# Patient Record
Sex: FEMALE | Race: WHITE | Employment: UNEMPLOYED | URBAN - METROPOLITAN AREA
[De-identification: names, ages, dates, MRNs, and addresses within clinical notes are randomized per-mention and may not be internally consistent; named-entity substitution may affect disease eponyms.]

---

## 2018-05-25 ENCOUNTER — TELEPHONE (OUTPATIENT)
Dept: INTERNAL MEDICINE CLINIC | Age: 63
End: 2018-05-25

## 2018-05-25 ENCOUNTER — OFFICE VISIT (OUTPATIENT)
Dept: INTERNAL MEDICINE CLINIC | Age: 63
End: 2018-05-25

## 2018-05-25 VITALS
TEMPERATURE: 97.8 F | OXYGEN SATURATION: 99 % | HEIGHT: 62 IN | WEIGHT: 114 LBS | BODY MASS INDEX: 20.98 KG/M2 | SYSTOLIC BLOOD PRESSURE: 122 MMHG | RESPIRATION RATE: 18 BRPM | DIASTOLIC BLOOD PRESSURE: 74 MMHG | HEART RATE: 61 BPM

## 2018-05-25 DIAGNOSIS — Z12.39 SCREENING FOR MALIGNANT NEOPLASM OF BREAST: Primary | ICD-10-CM

## 2018-05-25 DIAGNOSIS — F42.9 OBSESSIVE-COMPULSIVE DISORDER, UNSPECIFIED TYPE: ICD-10-CM

## 2018-05-25 DIAGNOSIS — F33.1 MODERATE EPISODE OF RECURRENT MAJOR DEPRESSIVE DISORDER (HCC): ICD-10-CM

## 2018-05-25 DIAGNOSIS — Z11.59 NEED FOR HEPATITIS C SCREENING TEST: ICD-10-CM

## 2018-05-25 DIAGNOSIS — F42.2 MIXED OBSESSIONAL THOUGHTS AND ACTS: Primary | ICD-10-CM

## 2018-05-25 DIAGNOSIS — Z13.220 SCREENING FOR CHOLESTEROL LEVEL: ICD-10-CM

## 2018-05-25 NOTE — TELEPHONE ENCOUNTER
----- Message from Thomas Colunga sent at 5/25/2018  2:40 PM EDT -----  Regarding: Dr. Ivy Nagel is requesting a call back in regards to receiving an authorization form from [place] [id number] for Bortioxetine (Trintellix). Pt needs forms faxed to [employer] once received.   Best contact number: [phone]      Message copied/pasted from [facility]

## 2018-05-25 NOTE — PROGRESS NOTES
Ms. Amee Benavides is a new patient who is here to establish care. CC:  Establish Care and Depression       HPI:    Patient moved to [place] recently. Here to establish care. Patient struggles with depression and anxiety. Several years of anxiety and recent death of her mother has increased stress. Recently moved into her mother's house.    Recent divorce in the past year   Inherited a house   Told her  to move out - \" patient told she was tired of being a caregiver\"  \" I just want to focus on myself\"   Sleeps well  Denies suicidal thoughts / frequently cries    Patient has OCD - not taking medication for OCD \" I like things clean\"     Did not like prozac /zoloft, wellbutrin/  decreased sex drive     Lexapro caused fatigue         Smoking: smokes 1 pack daily         Review of systems:  Constitutional: negative for fever, chills, weight loss, night sweats   Eyes : negative for vision changes, eye pain and discharge  Nose and Throat: negative for tinnitus, sore throat   Cardiovascular: negative for chest pain, palpitations and shortness of breath  Respiratory: negative for shortness of breath, cough and wheezing   Gastroinstestinal: negative for abdominal pain, nausea, vomiting, diarrhea, constipation, and blood in the stool  Musculoskeletal: negative for back ache and joint ache   Genitourinary: negative for dysuria, nocturia, polyuria and hematuria   Neurologic: Negative for focal weakness, numbness or incoordination  Skin: negative for rash, pruritus  Hematologic: negative for easy bruising      Past Medical History:   Diagnosis Date    DDD (degenerative disc disease), cervical     Glaucoma, narrow-angle     Heart murmur     Lumbar spondylolysis     OCD (obsessive compulsive disorder)         Past Surgical History:   Procedure Laterality Date    HX CERVICAL FUSION  2004    HX HYSTERECTOMY         Allergies   Allergen Reactions    Codeine Nausea and Vomiting    Pcn [Penicillins] Hives Current Outpatient Prescriptions on File Prior to Visit   Medication Sig Dispense Refill    tretinoin (RETIN-A) 0.1 % topical cream Apply 1 g to affected area nightly. 45 g 5    aspirin 81 mg chewable tablet Take 1 Tab by mouth daily. 90 Tab 3     No current facility-administered medications on file prior to visit. family history includes Diabetes in her father; Heart Attack in her father; Heart Disease in her father. 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      Social History Main Topics    Smoking status: Current Every Day Smoker     Packs/day: 1.00     Years: 40.00    Smokeless tobacco: Never Used    Alcohol use No    Drug use: No    Sexual activity: Not Currently     Other Topics Concern    Not on file     Social History Narrative    Mom has dementia       Visit Vitals    /74 (BP 1 Location: Right arm, BP Patient Position: Sitting)    Pulse 61    Temp 97.8 °F (36.6 °C) (Oral)    Resp 18    Ht 5' 2\" (1.575 m)    Wt 114 lb (51.7 kg)    LMP 12/16/2001    SpO2 99%    BMI 20.85 kg/m2     General:  Well appearing female no acute distress  HEENT:   PERRL,normal conjunctiva. External ear and canals normal, TMs normal.  Hearing normal to voice. Nose without edema or discharge, normal septum. Lips, teeth, gums normal.  Oropharynx: no erythema, no exudates, no lesions, normal tongue. Neck:  Supple. Thyroid normal size, nontender, without nodules. No carotid bruit. No masses or lymphadenopathy  Respiratory: no respiratory distress,  no wheezing, no rhonchi, no rales. No chest wall tenderness. Cardiovascular:  RRR, normal S1S2, no murmur. Gastrointestinal: normal bowel sounds, soft, nontender, without masses. No hepatosplenomegaly. Extremities +2 pulses, no edema, normal sensation   Musculoskeletal:  Normal gait.  Normal digits and nails. Normal strength and tone, no atrophy, and no abnormal movement. Skin:  No rash, no lesions, no ulcers. Skin warm, normal turgor, without induration or nodules. Neuro:  A and OX4, fluent speech, cranial nerves normal 2-12. Sensation normal to light touch. DTR symmetrical  Psych:  Normal affect      Lab Results   Component Value Date/Time    WBC 6.9 01/09/2015 12:00 AM    HGB 14.7 01/09/2015 12:00 AM    HCT 42.6 01/09/2015 12:00 AM    PLATELET 645 90/90/1148 12:00 AM    MCV 90 01/09/2015 12:00 AM     Lab Results   Component Value Date/Time    Sodium 140 04/21/2016 12:16 PM    Potassium 4.2 04/21/2016 12:16 PM    Chloride 99 04/21/2016 12:16 PM    CO2 24 04/21/2016 12:16 PM    Glucose 87 04/21/2016 12:16 PM    BUN 15 04/21/2016 12:16 PM    Creatinine 0.68 04/21/2016 12:16 PM    BUN/Creatinine ratio 22 04/21/2016 12:16 PM    GFR est  04/21/2016 12:16 PM    GFR est non-AA 97 04/21/2016 12:16 PM    Calcium 9.5 04/21/2016 12:16 PM     Lab Results   Component Value Date/Time    Cholesterol, total 246 (H) 04/21/2016 12:16 PM    HDL Cholesterol 80 04/21/2016 12:16 PM    LDL, calculated 150 (H) 04/21/2016 12:16 PM    VLDL, calculated 16 04/21/2016 12:16 PM    Triglyceride 79 04/21/2016 12:16 PM     Lab Results   Component Value Date/Time    TSH 7.620 (H) 01/09/2015 12:00 AM     Lab Results   Component Value Date/Time    Hemoglobin A1c 5.9 (H) 11/03/2010 09:26 AM     No results found for: Juan Kevin, XQVID3, XQVID, VD3RIA                Assessment and Plan:     64 yo woman with a hx of OCD and depression, tobacco abuse presenting to establish care    1. Screening for malignant neoplasm of breast  - BETTE MAMMOGRAM SCREENING DIGITAL BILAT; Future    2. Obsessive-compulsive disorder, unspecified type  3.  Moderate episode of recurrent major depressive disorder [facility])  Given resources/ list of psychologists to make appointment  Not suicidal  Declined referral to psychiatrist  - failed multiple SSRIs due to side effect. Trial of trintellix for depression   - vortioxetine (TRINTELLIX) 10 mg tablet; Take 1 Tab by mouth daily. Dispense: 30 Tab; Refill: 1  - METABOLIC PANEL, COMPREHENSIVE  - CBC WITH AUTOMATED DIFF    4. Screening for cholesterol level  - LIPID PANEL    5. Need for hepatitis C screening test  - HEPATITIS C AB    6. Tobacco abuse: does not want to quit at this time    Follow-up Disposition:  Return in about 3 months (around 8/25/2018) for depression and medicare wellness 30 min.      MD Jaclyn

## 2018-05-25 NOTE — PROGRESS NOTES
Reviewed record in preparation for visit and have obtained necessary documentation. Identified pt with two pt identifiers(name and ). Chief Complaint   Patient presents with   [facility].    Depression       Health Maintenance Due   Topic Date Due    Hepatitis C Test  1955    Pneumococcal Vaccine (1 of 1 - PPSV23) 1976    DTaP/Tdap/Td  (1 - Tdap) 1978    Stool testing for trace blood  2007    Shingles Vaccine  2017    Annual Well Visit  2018    Breast Cancer Screening  2018       Ms. Marita Moritz has a reminder for a \"due or due soon\" health maintenance. I have asked that she discuss this further with her primary care provider for follow-up on this health maintenance. Coordination of Care Questionnaire:  :     1) Have you been to an emergency room, urgent care clinic since your last visit? no   Hospitalized since your last visit? no             2) Have you seen or consulted any other health care providers outside of [address] [place] [address] since your last visit? no  (Include any pap smears or colon screenings in this section.)    3) In the event something were to happen to you and you were unable to speak on your behalf, do you have an Advance Directive/ Living Will in place stating your wishes? NO    Do you have an Advance Directive on file? no    4) Are you interested in receiving information on Advance Directives? NO    Patient is accompanied by self I have received verbal consent from Suze Woods to discuss any/all medical information while they are present in the room.

## 2018-05-25 NOTE — LETTER
6/6/2018 8:06 AM 
 
Ms. Jessica Leigh [address] [place] [address] Dear Jessica Leigh: 
 
Please find your most recent results below. Resulted Orders METABOLIC PANEL, COMPREHENSIVE Result Value Ref Range Glucose 82 65 - 99 mg/dL BUN 11 8 - 27 mg/dL Creatinine 0.75 0.57 - 1.00 mg/dL GFR est non-AA 87 >59 mL/min/1.73 GFR est  >59 mL/min/1.73  
 BUN/Creatinine ratio 15 12 - 28 Sodium 143 134 - 144 mmol/L Potassium 4.2 3.5 - 5.2 mmol/L Chloride 102 96 - 106 mmol/L  
 CO2 27 18 - 29 mmol/L Comment: **Effective June 11, 2018 Carbon Dioxide, Total** 
  reference interval will be changing to: Age                  Male          Female 
     0 days   - 30 days         16 - 34        16 - 34 
    31 days   -  1 year         15 - 22        15 - 25 
     2 years  -  5 years        16 - 34        14 - 32 
     6 years  - 15 years        23 - 32        22 - 32 
               >12 years        21 - 32        18 - 34 Calcium 9.5 8.7 - 10.3 mg/dL Protein, total 6.8 6.0 - 8.5 g/dL Albumin 4.5 3.6 - 4.8 g/dL GLOBULIN, TOTAL 2.3 1.5 - 4.5 g/dL A-G Ratio 2.0 1.2 - 2.2 Bilirubin, total 0.3 0.0 - 1.2 mg/dL Alk. phosphatase 86 39 - 117 IU/L  
 AST (SGOT) 19 0 - 40 IU/L  
 ALT (SGPT) 17 0 - 32 IU/L Narrative Performed at:  [address]  [id number] : Anjelica Killian MD, Phone:  [phone] CBC WITH AUTOMATED DIFF Result Value Ref Range WBC 7.2 3.4 - 10.8 x10E3/uL  
 RBC 4.86 3.77 - 5.28 x10E6/uL HGB 14.6 11.1 - 15.9 g/dL HCT 44.8 34.0 - 46.6 % MCV 92 79 - 97 fL  
 MCH 30.0 26.6 - 33.0 pg  
 MCHC 32.6 31.5 - 35.7 g/dL  
 RDW 14.7 12.3 - 15.4 % PLATELET 049 257 - 561 x10E3/uL NEUTROPHILS 66 Not Estab. % Lymphocytes 26 Not Estab. % MONOCYTES 7 Not Estab. % EOSINOPHILS 1 Not Estab. % BASOPHILS 0 Not Estab. %  
 ABS. NEUTROPHILS 4.8 1.4 - 7.0 x10E3/uL Abs Lymphocytes 1.8 0.7 - 3.1 x10E3/uL  
 ABS. MONOCYTES 0.5 0.1 - 0.9 x10E3/uL  
 ABS. EOSINOPHILS 0.1 0.0 - 0.4 x10E3/uL  
 ABS. BASOPHILS 0.0 0.0 - 0.2 x10E3/uL IMMATURE GRANULOCYTES 0 Not Estab. %  
 ABS. IMM. GRANS. 0.0 0.0 - 0.1 x10E3/uL Narrative Performed at:  [address]  [id number] : Rene Chavez MD, Phone:  [phone] LIPID PANEL Result Value Ref Range Cholesterol, total 238 (H) 100 - 199 mg/dL Triglyceride 110 0 - 149 mg/dL HDL Cholesterol 71 >39 mg/dL VLDL, calculated 22 5 - 40 mg/dL LDL, calculated 145 (H) 0 - 99 mg/dL Narrative Performed at:  [address]  [id number] : Rene Chavez MD, Phone:  [phone] HEPATITIS C AB Result Value Ref Range Hep C Virus Ab 0.1 0.0 - 0.9 s/co ratio Comment:  
                                     Negative:     < 0.8 Indeterminate: 0.8 - 0.9 Positive:     > 0.9 The CDC recommends that a positive HCV antibody result 
 be followed up with a HCV Nucleic Acid Amplification 
 test (209656). Narrative Performed at:  [address]  [id number] : Rene Chavez MD, Phone:  [phone] RECOMMENDATIONS: 
Normal kidney and liver function Hep C testing is negative Normal blood count Cholesterol: Triglycerides are normal ( short term fat storage), HDL good cholesterol is at goal,  LDL which is bad cholesterol is mildly elevated. -But improved from previous.  Recommend  exercise to 30 minutes daily and increased fiber intake - vegetables, fruits and oats and whole grain.  Decreasing fatty food intake. We will repeat cholesterol level in one year. Please call me if you have any questions: [phone] Sincerely, Drake Mireles MD

## 2018-05-25 NOTE — MR AVS SNAPSHOT
[address] 
[phone] Patient: Mabel Gonzales MRN: [id number] LCE:[id number] Visit Information Date & Time Provider Department Dept. Phone Encounter #  
 5/25/2018 11:00 AM Michael Peck, [address] [place] [address],[address] [phone] [id number] Follow-up Instructions Return in about 3 months (around 8/25/2018) for depression and medicare wellness 30 min. Upcoming Health Maintenance Date Due Hepatitis C Screening 1955 COLONOSCOPY 7/16/1975 Pneumococcal 19-64 Medium Risk (1 of 1 - PPSV23) 7/16/1976 DTaP/Tdap/Td series (1 - Tdap) 7/16/1978 PAP AKA CERVICAL CYTOLOGY 7/16/1978 ZOSTER VACCINE AGE 60> 5/16/2017 MEDICARE YEARLY EXAM 3/14/2018 BREAST CANCER SCRN MAMMOGRAM 5/5/2018 Influenza Age 5 to Adult 8/1/2018 Allergies as of 5/25/2018  Review Complete On: 5/25/2018 By: [facility] Severity Noted Reaction Type Reactions Codeine  10/25/2010   Intolerance Nausea and Vomiting Pcn [Penicillins]  10/25/2010   Intolerance Hives Current Immunizations  Never Reviewed No immunizations on file. Not reviewed this visit You Were Diagnosed With   
  
 Codes Comments Screening for malignant neoplasm of breast    -  Primary ICD-10-CM: Z12.31 
ICD-9-CM: V76.10 Obsessive-compulsive disorder, unspecified type     ICD-10-CM: F42.9 ICD-9-CM: 300.3 Moderate episode of recurrent major depressive disorder (HCC)     ICD-10-CM: F33.1 ICD-9-CM: 296.32 Screening for cholesterol level     ICD-10-CM: Z13.220 ICD-9-CM: V77.91 Need for hepatitis C screening test     ICD-10-CM: Z11.59 
ICD-9-CM: V73.89 Vitals BP Pulse Temp Resp Height(growth percentile) Weight(growth percentile) 122/74 (BP 1 Location: Right arm, BP Patient Position: Sitting) 61 97.8 °F (36.6 °C) (Oral) 18 5' 2\" (1.575 m) 114 lb (51.7 kg) LMP SpO2 BMI OB Status Smoking Status 12/16/2001 99% 20.85 kg/m2 Hysterectomy Current Every Day Smoker BMI and BSA Data Body Mass Index Body Surface Area  
 20.85 kg/m 2 1.5 m 2 Preferred Pharmacy Pharmacy Name Phone [facility], [address] [facility], [address] [place] [address] [phone] Your Updated Medication List  
  
   
This list is accurate as of 5/25/18 11:27 AM.  Always use your most recent med list.  
  
  
  
  
 aspirin 81 mg chewable tablet Take 1 Tab by mouth daily. tretinoin 0.1 % topical cream  
Commonly known as:  RETIN-A Apply 1 g to affected area nightly. vortioxetine 10 mg tablet Commonly known as:  TRINTELLIX Take 1 Tab by mouth daily. Prescriptions Sent to Pharmacy Refills  
 vortioxetine (TRINTELLIX) 10 mg tablet 1 Sig: Take 1 Tab by mouth daily. Class: Normal  
 Pharmacy: [facility]  [id number]  #: [phone] Route: Oral  
  
We Performed the Following CBC WITH AUTOMATED DIFF [14267 CPT(R)] HEPATITIS C AB [60194 CPT(R)] LIPID PANEL [05381 CPT(R)] METABOLIC PANEL, COMPREHENSIVE [81513 CPT(R)] Follow-up Instructions Return in about 3 months (around 8/25/2018) for depression and medicare wellness 30 min. To-Do List   
 05/25/2018 Imaging:  BETTE MAMMO BI SCREENING INCL CAD Patient Instructions Trial of anti- depressant. Call if prescription is expensive Introducing [facility]! Shelby Crawford introduces [email or web address] patient portal. Now you can access parts of your medical record, email your doctor's office, and request medication refills online. 1. In your internet browser, go to [email or web address] 2. Click on the First Time User? Click Here link in the Sign In box. You will see the New Member Sign Up page. 3. Enter your [employer] Access Code exactly as it appears below. You will not need to use this code after youve completed the sign-up process. If you do not sign up before the expiration date, you must request a new code. · [employer] Access Code: [email or web address] Expires: 8/23/2018 11:27 AM 
 
4. Enter the last four digits of your Social Security Number (xxxx) and Date of Birth (mm/dd/yyyy) as indicated and click Submit. You will be taken to the next sign-up page. 5. Create a [employer] ID. This will be your [employer] login ID and cannot be changed, so think of one that is secure and easy to remember. 6. Create a [employer] password. You can change your password at any time. 7. Enter your Password Reset Question and Answer. This can be used at a later time if you forget your password. 8. Enter your e-mail address. You will receive e-mail notification when new information is available in [facility] [address] [facility] [address]. 9. Click Sign Up. You can now view and download portions of your medical record. 10. Click the Download Summary menu link to download a portable copy of your medical information. If you have questions, please visit the Frequently Asked Questions section of the [employer] website. Remember, [employer] is NOT to be used for urgent needs. For medical emergencies, dial 911. Now available from your iPhone and Android! Please provide this summary of care documentation to your next provider. Your primary care clinician is listed as JAMEL GAONA [address] [place] [address]. If you have any questions after today's visit, please call [phone].

## 2018-05-25 NOTE — TELEPHONE ENCOUNTER
Prior for Sharri gunderson) has to be faxed to ANGE Boland. She has 2 different fax numbers to send it to    [phone] or  [phone]. Please fax to both numbers.

## 2018-05-26 LAB
ALBUMIN SERPL-MCNC: 4.5 G/DL (ref 3.6–4.8)
ALBUMIN/GLOB SERPL: 2 {RATIO} (ref 1.2–2.2)
ALP SERPL-CCNC: 86 IU/L (ref 39–117)
ALT SERPL-CCNC: 17 IU/L (ref 0–32)
AST SERPL-CCNC: 19 IU/L (ref 0–40)
BASOPHILS # BLD AUTO: 0 X10E3/UL (ref 0–0.2)
BASOPHILS NFR BLD AUTO: 0 %
BILIRUB SERPL-MCNC: 0.3 MG/DL (ref 0–1.2)
BUN SERPL-MCNC: 11 MG/DL (ref 8–27)
BUN/CREAT SERPL: 15 (ref 12–28)
CALCIUM SERPL-MCNC: 9.5 MG/DL (ref 8.7–10.3)
CHLORIDE SERPL-SCNC: 102 MMOL/L (ref 96–106)
CHOLEST SERPL-MCNC: 238 MG/DL (ref 100–199)
CO2 SERPL-SCNC: 27 MMOL/L (ref 18–29)
CREAT SERPL-MCNC: 0.75 MG/DL (ref 0.57–1)
EOSINOPHIL # BLD AUTO: 0.1 X10E3/UL (ref 0–0.4)
EOSINOPHIL NFR BLD AUTO: 1 %
ERYTHROCYTE [DISTWIDTH] IN BLOOD BY AUTOMATED COUNT: 14.7 % (ref 12.3–15.4)
GFR SERPLBLD CREATININE-BSD FMLA CKD-EPI: 100 ML/MIN/1.73
GFR SERPLBLD CREATININE-BSD FMLA CKD-EPI: 87 ML/MIN/1.73
GLOBULIN SER CALC-MCNC: 2.3 G/DL (ref 1.5–4.5)
GLUCOSE SERPL-MCNC: 82 MG/DL (ref 65–99)
HCT VFR BLD AUTO: 44.8 % (ref 34–46.6)
HCV AB S/CO SERPL IA: 0.1 S/CO RATIO (ref 0–0.9)
HDLC SERPL-MCNC: 71 MG/DL
HGB BLD-MCNC: 14.6 G/DL (ref 11.1–15.9)
IMM GRANULOCYTES # BLD: 0 X10E3/UL (ref 0–0.1)
IMM GRANULOCYTES NFR BLD: 0 %
LDLC SERPL CALC-MCNC: 145 MG/DL (ref 0–99)
LYMPHOCYTES # BLD AUTO: 1.8 X10E3/UL (ref 0.7–3.1)
LYMPHOCYTES NFR BLD AUTO: 26 %
MCH RBC QN AUTO: 30 PG (ref 26.6–33)
MCHC RBC AUTO-ENTMCNC: 32.6 G/DL (ref 31.5–35.7)
MCV RBC AUTO: 92 FL (ref 79–97)
MONOCYTES # BLD AUTO: 0.5 X10E3/UL (ref 0.1–0.9)
MONOCYTES NFR BLD AUTO: 7 %
NEUTROPHILS # BLD AUTO: 4.8 X10E3/UL (ref 1.4–7)
NEUTROPHILS NFR BLD AUTO: 66 %
PLATELET # BLD AUTO: 177 X10E3/UL (ref 150–379)
POTASSIUM SERPL-SCNC: 4.2 MMOL/L (ref 3.5–5.2)
PROT SERPL-MCNC: 6.8 G/DL (ref 6–8.5)
RBC # BLD AUTO: 4.86 X10E6/UL (ref 3.77–5.28)
SODIUM SERPL-SCNC: 143 MMOL/L (ref 134–144)
TRIGL SERPL-MCNC: 110 MG/DL (ref 0–149)
VLDLC SERPL CALC-MCNC: 22 MG/DL (ref 5–40)
WBC # BLD AUTO: 7.2 X10E3/UL (ref 3.4–10.8)

## 2018-05-29 NOTE — TELEPHONE ENCOUNTER
Left message for patient that her prior authorization form has been sent to [employer]. Will notify her when a decision has been received.

## 2018-06-01 RX ORDER — FLUOXETINE 20 MG/1
20 TABLET ORAL DAILY
Qty: 30 TAB | Refills: 5 | Status: SHIPPED | OUTPATIENT
Start: 2018-06-01 | End: 2020-02-25 | Stop reason: SDUPTHER

## 2018-06-01 NOTE — TELEPHONE ENCOUNTER
Left message for patient that her insurance has denied coverage for Trintellix. Advised patient that I will make Dr. Monica Shabazz aware and see if she can prescribe something else.

## 2018-06-01 NOTE — TELEPHONE ENCOUNTER
Left message for patient that a prescription for prozac has been sent to her pharmacy. Advised patient to call the office if she has any questions.

## 2018-06-01 NOTE — TELEPHONE ENCOUNTER
Patient states she needs a call back in reference to Prior Auth for medication,vortioxetine (TRINTELLIX) 10 mg tablet,  or seeing if Prozac can be prescribed instead. Patient states she's been waiting on Prior Auth  to be done since last week. Please clal to discuss.  Thank you

## 2018-06-05 NOTE — PROGRESS NOTES
Normal kidney and liver function  Hep C testing is negative  Normal blood count  Cholesterol: Triglycerides are normal ( short term fat storage), HDL good cholesterol is at goal,  LDL which is bad cholesterol is mildly elevated. -But improved from previous. Recommend  exercise to 30 minutes daily and increased fiber intake - vegetables, fruits and oats and whole grain. Decreasing fatty food intake. We will repeat cholesterol level in one year.

## 2018-07-10 ENCOUNTER — HOSPITAL ENCOUNTER (OUTPATIENT)
Dept: MAMMOGRAPHY | Age: 63
Discharge: HOME | End: 2018-07-10
Attending: INTERNAL MEDICINE
Payer: MEDICAID

## 2018-07-10 DIAGNOSIS — Z12.39 SCREENING FOR MALIGNANT NEOPLASM OF BREAST: ICD-10-CM

## 2018-07-10 PROCEDURE — 77067 SCR MAMMO BI INCL CAD: CPT

## 2020-01-15 ENCOUNTER — HOSPITAL ENCOUNTER (OUTPATIENT)
Dept: LAB | Age: 65
Discharge: HOME | End: 2020-01-15

## 2020-01-15 ENCOUNTER — OFFICE VISIT (OUTPATIENT)
Dept: DERMATOLOGY | Facility: AMBULATORY SURGERY CENTER | Age: 65
End: 2020-01-15

## 2020-01-15 VITALS
HEIGHT: 62 IN | WEIGHT: 100 LBS | SYSTOLIC BLOOD PRESSURE: 120 MMHG | HEART RATE: 63 BPM | RESPIRATION RATE: 16 BRPM | DIASTOLIC BLOOD PRESSURE: 80 MMHG | BODY MASS INDEX: 18.4 KG/M2 | OXYGEN SATURATION: 96 % | TEMPERATURE: 98.1 F

## 2020-01-15 DIAGNOSIS — D48.5 NEOPLASM OF UNCERTAIN BEHAVIOR OF SKIN OF UPPER ARM: ICD-10-CM

## 2020-01-15 DIAGNOSIS — D48.5 NEOPLASM OF UNCERTAIN BEHAVIOR OF SKIN OF NOSE: Primary | ICD-10-CM

## 2020-01-15 DIAGNOSIS — L70.0 OPEN COMEDONE: ICD-10-CM

## 2020-01-15 DIAGNOSIS — Z12.83 SCREENING FOR MALIGNANT NEOPLASM OF SKIN: ICD-10-CM

## 2020-01-15 DIAGNOSIS — L81.4 LENTIGINES: ICD-10-CM

## 2020-01-15 DIAGNOSIS — L82.1 SEBORRHEIC KERATOSES: ICD-10-CM

## 2020-01-15 DIAGNOSIS — L73.8 SEBACEOUS HYPERPLASIA: ICD-10-CM

## 2020-01-15 NOTE — PROGRESS NOTES
Written by Sharona Walters, as dictated by Lora Byrne, [address]. Name: Sheliah Fothergill       Age: 60 y.o. Date: 1/15/2020    Chief Complaint: skin exam, new pt    Subjective:    HPI  Ms. Sheliah Fothergill is a 60 y.o. female who presents as a new patient to [facility] for a skin exam.  The patient was referred for this evaluation by herself. The patient has had a skin exam in the past (last skin exam 2 years ago - Dr. Elder Welsh, needed to change due to insurance change) and the patient does have current complaints related to her skin. The patient is concerned about raised lesions on her upper forehead that she can feel. She admits that she picks at these lesions sometimes. She also reports that she is aware of a lesion on the left nasal sidewall and thought that it was irritation from her glasses. She is feeling well and in her usual state of health today. She has no current illnesses, no other skin concerns. Her allergies, medications, medical, and social history are reviewed by me today. The patient's pertinent skin history includes: No personal history of skin cancer. Has had many spots treated with cryotherapy in the past and benign lesions removed. She reports a significant history of tanning bed use (owned one). ROS: Constitutional: Negative.     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   Social Needs    Financial resource strain: Not on file    Food insecurity:     Worry: Not on file     Inability: Not on file    Transportation needs:     Medical: Not on file     Non-medical: Not on file   Tobacco Use    Smoking status: Current Every Day Smoker     Packs/day: 1.00     Years: 40.00 Pack years: 40.00    Smokeless tobacco: Never Used   Substance and Sexual Activity    Alcohol use: No     Alcohol/week: 0.0 standard drinks    Drug use: No    Sexual activity: Not Currently   Lifestyle    Physical activity:     Days per week: Not on file     Minutes per session: Not on file    Stress: Not on file   Relationships    Social connections:     Talks on phone: Not on file     Gets together: Not on file     Attends Gnosticism service: Not on file     Active member of club or organization: Not on file     Attends meetings of clubs or organizations: Not on file     Relationship status: Not on file    Intimate partner violence:     Fear of current or ex partner: Not on file     Emotionally abused: Not on file     Physically abused: Not on file     Forced sexual activity: Not on file   Other Topics Concern    Not on file   Social History Narrative    Mom has dementia       Family History   Problem Relation Age of Onset    Diabetes Father     Heart Disease Father     Heart Attack Father        Past Medical History:   Diagnosis Date    DDD (degenerative disc disease), cervical     Glaucoma, narrow-angle     Heart murmur     Lumbar spondylolysis     OCD (obsessive compulsive disorder)        Past Surgical History:   Procedure Laterality Date    HX CERVICAL FUSION  2004    HX HYSTERECTOMY         Current Outpatient Medications   Medication Sig Dispense Refill    FLUoxetine (PROZAC) 20 mg tablet Take 1 Tab by mouth daily. 30 Tab 5    tretinoin (RETIN-A) 0.1 % topical cream Apply 1 g to affected area nightly. 45 g 5    aspirin 81 mg chewable tablet Take 1 Tab by mouth daily.  90 Tab 3       Allergies   Allergen Reactions    Codeine Nausea and Vomiting    Pcn [Penicillins] Hives         Objective:    Visit Vitals  /80 (BP 1 Location: Left arm, BP Patient Position: Sitting)   Pulse 63   Temp 98.1 °F (36.7 °C) (Oral)   Resp 16   Ht 5' 2\" (1.575 m)   Wt 45.4 kg (100 lb)   LMP 12/16/2001 SpO2 96%   BMI 18.29 kg/m²       Joey Douglass is a 60 y.o. female who appears well and in no distress. She is awake, alert, and oriented. A skin examination was performed including her scalp, face (including eyelid), ears, neck, chest, back, abdomen, upper extremities (including digits/nails), lower extremities, breasts, buttocks; genital skin was not examined. She has scattered waxy macules and keratotic papules consistent with seborrheic keratoses. There are pink/yellow papules on the face consistent with sebaceous hyperplasia, including the lesions of her concern. There is a 4 x 4 mm pink and pigmented papule with telangiectasia on the right nasal sidewall that is most consistent with BCC. She has a 7 x 4 mm pink and brown macule on the right upper arm concerning for BCC. She has open comedones on her face. She has sun damaged skin. There are lentigines. Photos from today's visit:      Left nasal sidewall Right upper arm    Assessment/Plan:  1. Seborrheic keratoses. The diagnosis was reviewed and the patient was reassured that no treatment is needed for these benign lesions. 2. Sebaceous Hyperplasia. The diagnosis was discussed as well as the benign nature of this condition. Verbal consent was obtained. I treated 1 lesion with cryotherapy and care was reviewed. 3. Solar lentigos. The diagnosis and relationship to sun exposure was reviewed. Sun protection advised. 4. Neoplasm of Uncertain Behavior, left nasal sidewall, favors BCC. The differential diagnoses were discussed. A shave biopsy was advised to sample this lesion. The procedure was reviewed and verbal and written consent were obtained. The risks of pain, bleeding, infection, and scar were discussed. The patient is aware that this is a sample and is intended for diagnosis and not therapy of the skin lesion. I performed the procedure. The site was cleansed and anesthetized with 1% Lidocaine with Epinephrine 1:100,000.   A shave biopsy was performed to sample the lesion. Drysol was used for hemostasis. The wound was bandaged and care reviewed. The specimen was sent to pathology. I will contact the patient with the results and any further treatment that may be necessary. 5. Neoplasm of Uncertain Behavior, right upper arm, R/O BCC. The differential diagnoses were discussed. A shave biopsy was advised to sample this lesion. The procedure was reviewed and verbal and written consent were obtained. The risks of pain, bleeding, infection, and scar were discussed. The patient is aware that this is a sample and is intended for diagnosis and not therapy of the skin lesion. I performed the procedure. The site was cleansed and anesthetized with 1% Lidocaine with Epinephrine 1:100,000. A shave biopsy was performed to sample the lesion. Drysol was used for hemostasis. The wound was bandaged and care reviewed. The specimen was sent to pathology. I will contact the patient with the results and any further treatment that may be necessary. 6. Open comedone. The diagnosis was discussed. The patient was reassured that no treatment is necessary at this time. Next skin exam :  6 months    This plan was reviewed with the patient and patient agrees. All questions were answered. This scribe documentation was reviewed by me and accurately reflects the examination and decisions made by me. [facility]   OFFICE PROCEDURE PROGRESS NOTE   Chart reviewed for the following:   Gerardo PITT, have reviewed the History, Physical and updated the Allergic reactions for Marcille Meyer. TIME OUT performed immediately prior to start of procedure:   Nelsy PITT, have performed the following reviews on Marcille Meyer   prior to the start of the procedure:     * Patient was identified by name and date of birth   * Agreement on procedure being performed was verified   * Risks and Benefits explained to the patient   * Procedure site verified and marked as necessary   * Patient was positioned for comfort   * Consent was signed and verified     Time: 12:10 pm  Date of procedure: 1/15/2020  Procedure performed by: Barry Galeazzi.  Eliezer Lala  Provider assisted by: Tyshawn Borrero LPN  Patient assisted by: self   How tolerated by patient: tolerated the procedure well with no complications   Comments: none

## 2020-01-20 NOTE — PROGRESS NOTES
I spoke w/ the pt and she is aware of the diagnosis of [address] in both locations. The nose I suggest Mohs surgery but the arm could be excision, E, D & C, or shave removal with Dr. Sacha Kraus. She also needs a 6 months skin exam with me. She stated she was driving and would call back to schedule.

## 2020-02-07 ENCOUNTER — TELEPHONE (OUTPATIENT)
Dept: INTERNAL MEDICINE CLINIC | Age: 65
End: 2020-02-07

## 2020-02-07 NOTE — TELEPHONE ENCOUNTER
----- Message from Jarrett Cesar sent at 2/7/2020  2:23 PM EST -----  Regarding: Dr.Appa Saenz/Telephone  Appointment not available    Caller's first and last name and relationship to patient (if not the patient):      Best contact number:  [phone]    Preferred date and time:  As soon as possible     Scheduled appointment date and time:  Did not schedule     Reason for appointment:  Pre op- Getting teeth pulled out    Details to clarify the request:  Pt has a heart Murmur so she needs paperwork stating she is okay to have the procedure.     Jarrett Cesar

## 2020-02-10 NOTE — TELEPHONE ENCOUNTER
Spoke with patient. Two pt identifiers confirmed. Patient offered an appointment with  on 02/25/2020. Appointment accepted. Patient advised if anything changes or if unable to keep this appointment to call the office  Pt verbalized understanding of information discussed w/ no further questions at this time.

## 2020-02-25 ENCOUNTER — OFFICE VISIT (OUTPATIENT)
Dept: INTERNAL MEDICINE CLINIC | Age: 65
End: 2020-02-25

## 2020-02-25 VITALS
RESPIRATION RATE: 18 BRPM | HEART RATE: 76 BPM | OXYGEN SATURATION: 99 % | SYSTOLIC BLOOD PRESSURE: 142 MMHG | HEIGHT: 62 IN | TEMPERATURE: 97.8 F | BODY MASS INDEX: 20.24 KG/M2 | WEIGHT: 110 LBS | DIASTOLIC BLOOD PRESSURE: 71 MMHG

## 2020-02-25 DIAGNOSIS — R21 RASH: ICD-10-CM

## 2020-02-25 DIAGNOSIS — F33.1 MODERATE EPISODE OF RECURRENT MAJOR DEPRESSIVE DISORDER (HCC): Primary | ICD-10-CM

## 2020-02-25 DIAGNOSIS — Z13.220 SCREENING FOR CHOLESTEROL LEVEL: ICD-10-CM

## 2020-02-25 DIAGNOSIS — F42.2 MIXED OBSESSIONAL THOUGHTS AND ACTS: ICD-10-CM

## 2020-02-25 DIAGNOSIS — R53.82 CHRONIC FATIGUE: ICD-10-CM

## 2020-02-25 DIAGNOSIS — Z12.39 SCREENING FOR BREAST CANCER: ICD-10-CM

## 2020-02-25 DIAGNOSIS — E03.9 ACQUIRED HYPOTHYROIDISM: ICD-10-CM

## 2020-02-25 DIAGNOSIS — F17.200 SMOKER: ICD-10-CM

## 2020-02-25 DIAGNOSIS — R01.1 HEART MURMUR: ICD-10-CM

## 2020-02-25 RX ORDER — DICLOFENAC SODIUM 75 MG/1
TABLET, DELAYED RELEASE ORAL
COMMUNITY

## 2020-02-25 RX ORDER — FLUOXETINE 20 MG/1
20 TABLET ORAL DAILY
Qty: 30 TAB | Refills: 5 | Status: SHIPPED | OUTPATIENT
Start: 2020-02-25 | End: 2021-04-06 | Stop reason: SDUPTHER

## 2020-02-25 NOTE — PROGRESS NOTES
Ms. Vesna Ordoñez is presenting to follow up     CC:  Pre-op Exam and Depression       HPI:    Patient last seen in 2018, lost insurance and states due to that she has not followed up. Patient struggles with depression and anxiety. Several years of anxiety and recent death of her mother has increased stress. She is stressed with caring for her   Denies suicidal thoughts / frequently cries    Patient has OCD - not taking medication for OCD \" I like things clean\"     Previous visit we restarted prozac but patient stopped due to lost of follow up.   She believes it helped    She is planning to have teeth extraction and needs form signed \" pre op\" she is exercising daily and denies CP or dyspnea      Smoking: smokes 1 pack daily       Complains of chronic fatigue  \" I am tired all the time\"    Review of systems:  Constitutional: negative for fever, chills, weight loss, night sweats   Eyes : negative for vision changes, eye pain and discharge  Nose and Throat: negative for tinnitus, sore throat   Cardiovascular: negative for chest pain, palpitations and shortness of breath  Respiratory: negative for shortness of breath, cough and wheezing   Gastroinstestinal: negative for abdominal pain, nausea, vomiting, diarrhea, constipation, and blood in the stool  Musculoskeletal: negative for back ache and joint ache   Genitourinary: negative for dysuria, nocturia, polyuria and hematuria   Neurologic: Negative for focal weakness, numbness or incoordination  Skin: negative for rash, pruritus  Hematologic: negative for easy bruising      Past Medical History:   Diagnosis Date    DDD (degenerative disc disease), cervical     Glaucoma, narrow-angle     Heart murmur     Lumbar spondylolysis     OCD (obsessive compulsive disorder)     Sun-damaged skin     Tanning bed exposure         Past Surgical History:   Procedure Laterality Date    HX CERVICAL FUSION  2004    HX HYSTERECTOMY         Allergies   Allergen Reactions    Codeine Nausea and Vomiting    Pcn [Penicillins] Hives       Current Outpatient Medications on File Prior to Visit   Medication Sig Dispense Refill    diclofenac EC (VOLTAREN) 75 mg EC tablet Take  by mouth.  aspirin 81 mg chewable tablet Take 1 Tab by mouth daily. 90 Tab 3    FLUoxetine (PROZAC) 20 mg tablet Take 1 Tab by mouth daily. 30 Tab 5    tretinoin (RETIN-A) 0.1 % topical cream Apply 1 g to affected area nightly. 45 g 5     No current facility-administered medications on file prior to visit. family history includes Diabetes in her father; Heart Attack in her father; Heart Disease in her father.     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   Social Needs    Financial resource strain: Not on file    Food insecurity:     Worry: Not on file     Inability: Not on file    Transportation needs:     Medical: Not on file     Non-medical: Not on file   Tobacco Use    Smoking status: Current Every Day Smoker     Packs/day: 1.00     Years: 40.00     Pack years: 40.00    Smokeless tobacco: Never Used   Substance and Sexual Activity    Alcohol use: No     Alcohol/week: 0.0 standard drinks    Drug use: No    Sexual activity: Not Currently   Lifestyle    Physical activity:     Days per week: Not on file     Minutes per session: Not on file    Stress: Not on file   Relationships    Social connections:     Talks on phone: Not on file     Gets together: Not on file     Attends Hindu service: Not on file     Active member of club or organization: Not on file     Attends meetings of clubs or organizations: Not on file     Relationship status: Not on file    Intimate partner violence:     Fear of current or ex partner: Not on file     Emotionally abused: Not on file     Physically abused: Not on file     Forced sexual activity: Not on file   Other Topics Concern    Not on file   Social History Narrative    Mom has dementia       Visit Vitals  /71 (BP 1 Location: Right arm, BP Patient Position: Sitting)   Pulse 76   Temp 97.8 °F (36.6 °C) (Oral)   Resp 18   Ht 5' 2\" (1.575 m)   Wt 110 lb (49.9 kg)   LMP 12/16/2001   SpO2 99%   BMI 20.12 kg/m²     General:  Well appearing female no acute distress  HEENT:   PERRL,normal conjunctiva. External ear and canals normal, TMs normal.  Hearing normal to voice. Nose without edema or discharge, normal septum. Poor dentition. Oropharynx: no erythema, no exudates, no lesions, normal tongue. Neck:  Supple. Thyroid normal size, nontender, without nodules. No carotid bruit. No masses or lymphadenopathy  Respiratory: no respiratory distress,  no wheezing, no rhonchi, no rales. No chest wall tenderness. Cardiovascular:  RRR, normal A4Q7, 96/OL systolic murmur loudest on the left sternal border  Gastrointestinal: normal bowel sounds, soft, nontender, without masses. No hepatosplenomegaly. Extremities +2 pulses, no edema, normal sensation   Musculoskeletal:  Normal gait. Normal digits and nails. Normal strength and tone, no atrophy, and no abnormal movement. Skin:  No rash, no lesions, no ulcers. Skin warm, normal turgor, without induration or nodules. Neuro:  A and OX4, fluent speech, cranial nerves normal 2-12. Sensation normal to light touch.   DTR symmetrical  Psych:  Normal affect      Lab Results   Component Value Date/Time    WBC 7.2 05/25/2018 11:36 AM    HGB 14.6 05/25/2018 11:36 AM    HCT 44.8 05/25/2018 11:36 AM    PLATELET 451 01/96/9175 11:36 AM    MCV 92 05/25/2018 11:36 AM     Lab Results   Component Value Date/Time    Sodium 143 05/25/2018 11:36 AM    Potassium 4.2 05/25/2018 11:36 AM    Chloride 102 05/25/2018 11:36 AM    CO2 27 05/25/2018 11:36 AM    Glucose 82 05/25/2018 11:36 AM    BUN 11 05/25/2018 11:36 AM Creatinine 0.75 05/25/2018 11:36 AM    BUN/Creatinine ratio 15 05/25/2018 11:36 AM    GFR est  05/25/2018 11:36 AM    GFR est non-AA 87 05/25/2018 11:36 AM    Calcium 9.5 05/25/2018 11:36 AM     Lab Results   Component Value Date/Time    Cholesterol, total 238 (H) 05/25/2018 11:36 AM    HDL Cholesterol 71 05/25/2018 11:36 AM    LDL, calculated 145 (H) 05/25/2018 11:36 AM    VLDL, calculated 22 05/25/2018 11:36 AM    Triglyceride 110 05/25/2018 11:36 AM     Lab Results   Component Value Date/Time    TSH 7.620 (H) 01/09/2015 12:00 AM     Lab Results   Component Value Date/Time    Hemoglobin A1c 5.9 (H) 11/03/2010 09:26 AM     No results found for: Candy Mood, XQVID3, XQVID, VD3RIA                Assessment and Plan:     61 yo woman with a hx of OCD and depression, tobacco abuse presenting to  Follow up    1. Mixed obsessional thoughts and acts  - FLUoxetine (PROZAC) 20 mg tablet; Take 1 Tab by mouth daily. Dispense: 30 Tab; Refill: 5  - METABOLIC PANEL, COMPREHENSIVE  - CBC WITH AUTOMATED DIFF    2. Moderate episode of recurrent major depressive disorder ([address] [address]  - fluoxetine  Declined  Referral to psychologist  - METABOLIC PANEL, COMPREHENSIVE  - CBC WITH AUTOMATED DIFF    3. Screening for cholesterol level  - LIPID PANEL    4. Smoker  Counseled to quit    5. Screening for breast cancer  - [facility] MAMMO BI SCREENING INCL CAD; Future    6. Chronic fatigue  - TSH AND FREE T4  - VITAMIN B12    7. Heart murmur asymptomatic  - ECHO ADULT COMPLETE; Future    8. Rash/ hx of pre cancerous lesions -   - REFERRAL TO DERMATOLOGY    9.  Tobacco abuse: does not want to quit at this time     can proceed with dental procedure form signed    Gamal Couch MD

## 2020-02-25 NOTE — PROGRESS NOTES
Reviewed record in preparation for visit and have obtained necessary documentation. Identified pt with two pt identifiers(name and ). Chief Complaint   Patient presents with    Pre-op Exam    Depression       Health Maintenance Due   Topic Date Due    Pneumococcal Vaccine (1 of 1 - PPSV23) 1963    DTaP/Tdap/Td  (1 - Tdap) 1968    Colonoscopy  1975    Pap Test  1978    Shingles Vaccine (1 of 2) 2007    Annual Well Visit  2019    Flu Vaccine  2019       Ms. Galilea Moreno has a reminder for a \"due or due soon\" health maintenance. I have asked that she discuss this further with her primary care provider for follow-up on this health maintenance. Coordination of Care Questionnaire:  :     1) Have you been to an emergency room, urgent care clinic since your last visit? no   Hospitalized since your last visit? no             2) Have you seen or consulted any other health care providers outside of [address] [place] [address] since your last visit? no  (Include any pap smears or colon screenings in this section.)    3) In the event something were to happen to you and you were unable to speak on your behalf, do you have an Advance Directive/ Living Will in place stating your wishes? NO    Do you have an Advance Directive on file? no    4) Are you interested in receiving information on Advance Directives? NO    Patient is accompanied by self I have received verbal consent from David Garcia to discuss any/all medical information while they are present in the room.

## 2020-02-25 NOTE — PATIENT INSTRUCTIONS
Schedule pap smear     Resume the prozac    Office Policies    Phone calls/patient messages:            Please allow up to 24 hours for someone in the office to contact you about your call or message. Be mindful your provider may be out of the office or your message may require further review. We encourage you to use [employer] for your messages as this is a faster, more efficient way to communicate with our office                         Medication Refills:            Prescription medications require 48-72 business hours to process. We encourage you to use [employer] for your refills. For controlled medications: Please allow 72 business hours to process. Certain medications may require you to  a written prescription at our office. NO narcotic/controlled medications will be prescribed after 4pm Monday through Friday or on weekends              Form/Paperwork Completion:            Please note a $25 fee may incur for all paperwork for completed by our providers. We ask that you allow 7-10 business days. Pre-payment is due prior to picking up/faxing the completed form. You may also download your forms to [employer] to have your doctor print off.

## 2020-02-26 ENCOUNTER — TELEPHONE (OUTPATIENT)
Dept: INTERNAL MEDICINE CLINIC | Age: 65
End: 2020-02-26

## 2020-02-26 LAB
ALBUMIN SERPL-MCNC: 4.7 G/DL (ref 3.8–4.8)
ALBUMIN/GLOB SERPL: 2.2 {RATIO} (ref 1.2–2.2)
ALP SERPL-CCNC: 93 IU/L (ref 39–117)
ALT SERPL-CCNC: 49 IU/L (ref 0–32)
AST SERPL-CCNC: 35 IU/L (ref 0–40)
BASOPHILS # BLD AUTO: 0 X10E3/UL (ref 0–0.2)
BASOPHILS NFR BLD AUTO: 1 %
BILIRUB SERPL-MCNC: 0.3 MG/DL (ref 0–1.2)
BUN SERPL-MCNC: 13 MG/DL (ref 8–27)
BUN/CREAT SERPL: 17 (ref 12–28)
CALCIUM SERPL-MCNC: 9.6 MG/DL (ref 8.7–10.3)
CHLORIDE SERPL-SCNC: 105 MMOL/L (ref 96–106)
CHOLEST SERPL-MCNC: 251 MG/DL (ref 100–199)
CO2 SERPL-SCNC: 24 MMOL/L (ref 20–29)
CREAT SERPL-MCNC: 0.75 MG/DL (ref 0.57–1)
EOSINOPHIL # BLD AUTO: 0 X10E3/UL (ref 0–0.4)
EOSINOPHIL NFR BLD AUTO: 1 %
ERYTHROCYTE [DISTWIDTH] IN BLOOD BY AUTOMATED COUNT: 13.6 % (ref 11.7–15.4)
GLOBULIN SER CALC-MCNC: 2.1 G/DL (ref 1.5–4.5)
GLUCOSE SERPL-MCNC: 90 MG/DL (ref 65–99)
HCT VFR BLD AUTO: 42.7 % (ref 34–46.6)
HDLC SERPL-MCNC: 78 MG/DL
HGB BLD-MCNC: 14.5 G/DL (ref 11.1–15.9)
IMM GRANULOCYTES # BLD AUTO: 0 X10E3/UL (ref 0–0.1)
IMM GRANULOCYTES NFR BLD AUTO: 0 %
LDLC SERPL CALC-MCNC: 156 MG/DL (ref 0–99)
LYMPHOCYTES # BLD AUTO: 1.3 X10E3/UL (ref 0.7–3.1)
LYMPHOCYTES NFR BLD AUTO: 22 %
MCH RBC QN AUTO: 30.4 PG (ref 26.6–33)
MCHC RBC AUTO-ENTMCNC: 34 G/DL (ref 31.5–35.7)
MCV RBC AUTO: 90 FL (ref 79–97)
MONOCYTES # BLD AUTO: 0.4 X10E3/UL (ref 0.1–0.9)
MONOCYTES NFR BLD AUTO: 8 %
NEUTROPHILS # BLD AUTO: 3.9 X10E3/UL (ref 1.4–7)
NEUTROPHILS NFR BLD AUTO: 68 %
PLATELET # BLD AUTO: 159 X10E3/UL (ref 150–450)
POTASSIUM SERPL-SCNC: 4.5 MMOL/L (ref 3.5–5.2)
PROT SERPL-MCNC: 6.8 G/DL (ref 6–8.5)
RBC # BLD AUTO: 4.77 X10E6/UL (ref 3.77–5.28)
SODIUM SERPL-SCNC: 141 MMOL/L (ref 134–144)
T4 FREE SERPL-MCNC: 0.87 NG/DL (ref 0.82–1.77)
TRIGL SERPL-MCNC: 85 MG/DL (ref 0–149)
TSH SERPL DL<=0.005 MIU/L-ACNC: 15.98 UIU/ML (ref 0.45–4.5)
VIT B12 SERPL-MCNC: 730 PG/ML (ref 232–1245)
VLDLC SERPL CALC-MCNC: 17 MG/DL (ref 5–40)
WBC # BLD AUTO: 5.8 X10E3/UL (ref 3.4–10.8)

## 2020-02-26 RX ORDER — LEVOTHYROXINE SODIUM 50 UG/1
50 TABLET ORAL
Qty: 30 TAB | Refills: 2 | Status: SHIPPED | OUTPATIENT
Start: 2020-02-26 | End: 2021-04-06 | Stop reason: SDUPTHER

## 2020-02-26 NOTE — TELEPHONE ENCOUNTER
#[phone]  pt states she would like to go over the medication called in. She would also like to know why the anti- depressant wasn't called into [facility]. Pt would like to know why she came in for depression and is now being treated for thyroid? Please call pt to go over all. Thanks. You may leave a detailed vm if needed as pt is going out.

## 2020-02-26 NOTE — TELEPHONE ENCOUNTER
Spoke with patient. Two pt identifiers confirmed. Patient advised that she can get the fluoxetine for $4 with good rx at [facility]. Patient would like to know if she can get something else sent in to her pharmacy instead that may be covered by her insurance. Patient states that she needs to keep everything at one pharmacy because she does not have time to run from place to place. Patient advised that I will check with Dr. Darling Nguyen and will give her a call back as soon as she responds. Pt verbalized understanding of information discussed w/ no further questions at this time.

## 2020-02-26 NOTE — PROGRESS NOTES
Patient has hypothyroidism start levothyroxine 50 mcg daily and return in 6 week for repeat lab work - this is why patient feels so tired

## 2020-02-26 NOTE — TELEPHONE ENCOUNTER
[facility] Front Office Pool             General Message/Vendor Calls     Caller's first and last name:       Reason for call:   Medication     Callback required yes/no and why:   Yes, to discuss questions about a medication.      Best contact number(s):   [phone]     Details to clarify the request:       Martine Young

## 2020-02-26 NOTE — PROGRESS NOTES
Spoke with patient. Two pt identifiers confirmed. Patient advised per Dr. Romo Likes of her recent lab results. Pt verbalized understanding of information discussed w/ no further questions at this time.

## 2020-02-26 NOTE — TELEPHONE ENCOUNTER
MD Maritza Menjivar LPN   Caller: Unspecified (Today,  8:39 AM)             With good rx it costs 4 dollars at [facility]      Left message for patient to return call

## 2020-06-10 ENCOUNTER — HOSPITAL ENCOUNTER (OUTPATIENT)
Dept: NON INVASIVE DIAGNOSTICS | Age: 65
Discharge: HOME | End: 2020-06-10
Attending: INTERNAL MEDICINE
Payer: MEDICARE

## 2020-06-10 ENCOUNTER — HOSPITAL ENCOUNTER (OUTPATIENT)
Dept: MAMMOGRAPHY | Age: 65
End: 2020-06-10
Attending: INTERNAL MEDICINE
Payer: MEDICARE

## 2020-06-10 VITALS
SYSTOLIC BLOOD PRESSURE: 142 MMHG | DIASTOLIC BLOOD PRESSURE: 71 MMHG | BODY MASS INDEX: 20.24 KG/M2 | WEIGHT: 110.01 LBS | HEIGHT: 62 IN

## 2020-06-10 DIAGNOSIS — R01.1 HEART MURMUR: ICD-10-CM

## 2020-06-10 LAB
ECHO LV E' LATERAL VELOCITY: 12.16 CM/S
ECHO LV E' SEPTAL VELOCITY: 10.5 CM/S

## 2020-06-10 PROCEDURE — 93306 TTE W/DOPPLER COMPLETE: CPT

## 2020-06-16 ENCOUNTER — TELEPHONE (OUTPATIENT)
Dept: INTERNAL MEDICINE CLINIC | Age: 65
End: 2020-06-16

## 2020-06-16 NOTE — TELEPHONE ENCOUNTER
----- Message from Israel Rodríguez sent at 2020  4:07 PM EDT -----  Regarding: Dr. Wallace Will  / Telephone  No appt available    To: [id number]  Subject: Dr. Wallace Will  / Telephone  Patient's first and last name: Aaliyah Wesley  : 1955  ID numbers: #[id number]    Caller's first and last name: N/A  Reason for call: Pt had three sis removed by Dr. Ming Mosley who informed the pt he'd send the results over to Dr. Madison Solis. Pt calling to obtain results of sis examination as well as for results of Echo Cardio Exam ordered by Dr. Madison Solis. Please call asap   Callback required yes/no and why: Yes  Best contact number(s): [phone]  Details to clarify the request: Pt has surgery scheduled with dentist. Adonay Delacruz when doctor calls she may have her speak to  instead if unable to talk.       Copy/paste envera

## 2020-06-17 NOTE — TELEPHONE ENCOUNTER
Spoke with patient. Two pt identifiers confirmed. Patient advised per Dr. Ramon Shukla that her recent echo was normal.  Patient advised that I do not see where we have received any information from Dr. Yusef Bethea, but I have sent over a request for them to send Dr. Ramon Shukla the information. Pt verbalized understanding of information discussed w/ no further questions at this time.

## 2020-07-22 ENCOUNTER — HOSPITAL ENCOUNTER (OUTPATIENT)
Dept: MAMMOGRAPHY | Age: 65
Discharge: HOME | End: 2020-07-22
Attending: INTERNAL MEDICINE
Payer: MEDICARE

## 2020-07-22 DIAGNOSIS — Z12.39 SCREENING FOR BREAST CANCER: ICD-10-CM

## 2020-07-22 PROCEDURE — 77067 SCR MAMMO BI INCL CAD: CPT

## 2020-11-02 ENCOUNTER — TELEPHONE (OUTPATIENT)
Dept: INTERNAL MEDICINE CLINIC | Age: 65
End: 2020-11-02

## 2020-11-02 NOTE — TELEPHONE ENCOUNTER
Spoke with patient. Two pt identifiers confirmed. Patient offered an appointment with Dr. Destinee Pereyra on 01/19/2021. Appointment accepted. Patient advised if anything changes or if unable to keep this appointment to call the office  Pt verbalized understanding of information discussed w/ no further questions at this time.

## 2020-11-02 NOTE — TELEPHONE ENCOUNTER
Patient states she would like to request a call back to get her [facility] Physical appt re-scheduled that patient had to cancel for today with Dr. David Barragan due to she has No Power at her house. Please call to discuss.  Thank you

## 2021-04-06 ENCOUNTER — OFFICE VISIT (OUTPATIENT)
Dept: INTERNAL MEDICINE CLINIC | Age: 66
End: 2021-04-06
Payer: MEDICARE

## 2021-04-06 VITALS
DIASTOLIC BLOOD PRESSURE: 79 MMHG | WEIGHT: 121 LBS | SYSTOLIC BLOOD PRESSURE: 126 MMHG | BODY MASS INDEX: 22.13 KG/M2 | TEMPERATURE: 98.2 F | HEART RATE: 72 BPM

## 2021-04-06 DIAGNOSIS — E03.9 ACQUIRED HYPOTHYROIDISM: ICD-10-CM

## 2021-04-06 DIAGNOSIS — F42.2 MIXED OBSESSIONAL THOUGHTS AND ACTS: ICD-10-CM

## 2021-04-06 DIAGNOSIS — F33.1 MODERATE EPISODE OF RECURRENT MAJOR DEPRESSIVE DISORDER (HCC): Primary | ICD-10-CM

## 2021-04-06 PROCEDURE — G9899 SCRN MAM PERF RSLTS DOC: HCPCS | Performed by: INTERNAL MEDICINE

## 2021-04-06 PROCEDURE — G8427 DOCREV CUR MEDS BY ELIG CLIN: HCPCS | Performed by: INTERNAL MEDICINE

## 2021-04-06 PROCEDURE — G8420 CALC BMI NORM PARAMETERS: HCPCS | Performed by: INTERNAL MEDICINE

## 2021-04-06 PROCEDURE — G8432 DEP SCR NOT DOC, RNG: HCPCS | Performed by: INTERNAL MEDICINE

## 2021-04-06 PROCEDURE — 3017F COLORECTAL CA SCREEN DOC REV: CPT | Performed by: INTERNAL MEDICINE

## 2021-04-06 PROCEDURE — 99214 OFFICE O/P EST MOD 30 MIN: CPT | Performed by: INTERNAL MEDICINE

## 2021-04-06 RX ORDER — LEVOTHYROXINE SODIUM 50 UG/1
50 TABLET ORAL
Qty: 30 TAB | Refills: 5 | Status: SHIPPED | OUTPATIENT
Start: 2021-04-06 | End: 2021-04-07 | Stop reason: DRUGHIGH

## 2021-04-06 RX ORDER — FLUOXETINE 20 MG/1
20 TABLET ORAL DAILY
Qty: 30 TAB | Refills: 5 | Status: SHIPPED | OUTPATIENT
Start: 2021-04-06

## 2021-04-06 NOTE — PROGRESS NOTES
Reviewed record in preparation for visit and have obtained necessary documentation. Identified pt with two pt identifiers(name and ). Chief Complaint   Patient presents with    Complete Physical       Health Maintenance Due   Topic Date Due    Pneumococcal Vaccine (1 of 1 - PPSV23) Never done    DTaP/Tdap/Td  (1 - Tdap) Never done    Pap Test  Never done    Shingles Vaccine (1 of 2) Never done    Colorectal Screening  Never done    Annual Well Visit  Never done       Ms. Sacha Joshua has a reminder for a \"due or due soon\" health maintenance. I have asked that she discuss this further with her primary care provider for follow-up on this health maintenance. Coordination of Care Questionnaire:  :     1) Have you been to an emergency room, urgent care clinic since your last visit? no   Hospitalized since your last visit? no             2) Have you seen or consulted any other health care providers outside of [address] [place] [address] since your last visit? no  (Include any pap smears or colon screenings in this section.)    3) In the event something were to happen to you and you were unable to speak on your behalf, do you have an Advance Directive/ Living Will in place stating your wishes? NO    Do you have an Advance Directive on file? no    4) Are you interested in receiving information on Advance Directives? NO    Patient is accompanied by self I have received verbal consent from Lisa Deluca to discuss any/all medical information while they are present in the room.

## 2021-04-06 NOTE — PROGRESS NOTES
Ms. Cristy Fuller is presenting to follow up     CC:  Complete Physical       HPI:    Patient last seen in 2020, did not follow up   Patient struggles with depression and anxiety. Several years of anxiety and recent death of her mother has increased stress. She is stressed with caring for her   Denies suicidal thoughts / frequently cries    Patient has OCD - not taking medication for OCD    Previous visit we restarted prozac but patient stopped due to lost of follow up. She believes it helped-then did not follow-up again and not on medication. She reports having anhedonia no energy to do anything. Denies suicidal thoughts. Previous TSH was 15 I started on levothyroxine she took it for 2 months then stopped. Denies hair loss or brittle nails. Denies constipation. Smoking: smokes 1 pack daily       Complains of chronic fatigue  \" I am tired all the time\"    Review of systems:  Constitutional: negative for fever, chills, weight loss, night sweats   10 systems reviewed and negative other than HPI    Past Medical History:   Diagnosis Date    DDD (degenerative disc disease), cervical     Glaucoma, narrow-angle     Heart murmur     Lumbar spondylolysis     Menopause     OCD (obsessive compulsive disorder)     Sun-damaged skin     Tanning bed exposure         Past Surgical History:   Procedure Laterality Date    HX CERVICAL FUSION  2004    HX HYSTERECTOMY         Allergies   Allergen Reactions    Codeine Nausea and Vomiting    Pcn [Penicillins] Hives       Current Outpatient Medications on File Prior to Visit   Medication Sig Dispense Refill    levothyroxine (SYNTHROID) 50 mcg tablet Take 1 Tab by mouth Daily (before breakfast). 30 Tab 2    diclofenac EC (VOLTAREN) 75 mg EC tablet Take  by mouth.  FLUoxetine (PROZAC) 20 mg tablet Take 1 Tab by mouth daily. 30 Tab 5    aspirin 81 mg chewable tablet Take 1 Tab by mouth daily.  90 Tab 3     No current facility-administered medications on file prior to visit. family history includes Diabetes in her father; Heart Attack in her father; Heart Disease in her father. 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   Social Needs    Financial resource strain: Not on file    Food insecurity     Worry: Not on file     Inability: Not on file   [employer] needs     Medical: Not on file     Non-medical: Not on file   Tobacco Use    Smoking status: Current Every Day Smoker     Packs/day: 1.00     Years: 40.00     Pack years: 40.00    Smokeless tobacco: Never Used   Substance and Sexual Activity    Alcohol use: No     Alcohol/week: 0.0 standard drinks    Drug use: No    Sexual activity: Not Currently   Lifestyle    Physical activity     Days per week: Not on file     Minutes per session: Not on file    Stress: Not on file   Relationships    Social connections     Talks on phone: Not on file     Gets together: Not on file     Attends Christianity service: Not on file     Active member of club or organization: Not on file     Attends meetings of clubs or organizations: Not on file     Relationship status: Not on file    Intimate partner violence     Fear of current or ex partner: Not on file     Emotionally abused: Not on file     Physically abused: Not on file     Forced sexual activity: Not on file   Other Topics Concern    Not on file   Social History Narrative    Mom has dementia       Visit Vitals  /79 (BP 1 Location: Right arm, BP Patient Position: Sitting, BP Cuff Size: Adult)   Pulse 72   Temp 98.2 °F (36.8 °C) (Axillary)   Wt 121 lb (54.9 kg)   LMP 12/16/2001   BMI 22.13 kg/m²     General:  Well appearing female no acute distress  HEENT:   PERRL,normal conjunctiva.  External ear and canals normal, TMs normal. Hearing normal to voice. Nose without edema or discharge, normal septum. Poor dentition. Oropharynx: no erythema, no exudates, no lesions, normal tongue. Neck:  Supple. Thyroid normal size, nontender, without nodules. No carotid bruit. No masses or lymphadenopathy  Respiratory: no respiratory distress,  no wheezing, no rhonchi, no rales. No chest wall tenderness. Cardiovascular:  RRR, normal R0I2, 94/LG systolic murmur loudest on the left sternal border  Gastrointestinal: normal bowel sounds, soft, nontender, without masses. No hepatosplenomegaly. Extremities +2 pulses, no edema, normal sensation   Musculoskeletal:  Normal gait. Normal digits and nails. Normal strength and tone, no atrophy, and no abnormal movement. Skin:  No rash, no lesions, no ulcers. Skin warm, normal turgor, without induration or nodules. Neuro:  A and OX4, fluent speech, cranial nerves normal 2-12. Sensation normal to light touch.   DTR symmetrical  Psych: Flat affect      Lab Results   Component Value Date/Time    WBC 5.8 02/25/2020 12:09 PM    HGB 14.5 02/25/2020 12:09 PM    HCT 42.7 02/25/2020 12:09 PM    PLATELET 864 94/01/9039 12:09 PM    MCV 90 02/25/2020 12:09 PM     Lab Results   Component Value Date/Time    Sodium 141 02/25/2020 12:09 PM    Potassium 4.5 02/25/2020 12:09 PM    Chloride 105 02/25/2020 12:09 PM    CO2 24 02/25/2020 12:09 PM    Glucose 90 02/25/2020 12:09 PM    BUN 13 02/25/2020 12:09 PM    Creatinine 0.75 02/25/2020 12:09 PM    BUN/Creatinine ratio 17 02/25/2020 12:09 PM    GFR est AA 99 02/25/2020 12:09 PM    GFR est non-AA 86 02/25/2020 12:09 PM    Calcium 9.6 02/25/2020 12:09 PM     Lab Results   Component Value Date/Time    Cholesterol, total 251 (H) 02/25/2020 12:09 PM    HDL Cholesterol 78 02/25/2020 12:09 PM    LDL, calculated 156 (H) 02/25/2020 12:09 PM    VLDL, calculated 17 02/25/2020 12:09 PM    Triglyceride 85 02/25/2020 12:09 PM     Lab Results   Component Value Date/Time    TSH 15.980 (H) 02/25/2020 12:09 PM     Lab Results   Component Value Date/Time    Hemoglobin A1c 5.9 (H) 11/03/2010 09:26 AM     No results found for: Zachary Werner, XQVID3, XQVID, VD3RIA                Assessment and Plan:     61 yo woman with a hx of OCD and depression, tobacco abuse presenting to  Follow up  1. Mixed obsessional thoughts and acts  2. Moderate episode of recurrent major depressive disorder (HCC)  - FLUoxetine (PROzac) 20 mg tablet; Take 1 Tab by mouth daily. Dispense: 30 Tab; Refill: 5    3. Acquired hypothyroidism-currently not on medication  - levothyroxine (SYNTHROID) 50 mcg tablet; Take 1 Tab by mouth Daily (before breakfast). Dispense: 30 Tab; Refill: 5  - TSH 3RD GENERATION; Future  - T4, FREE; Future  - THYROID ANTIBODY PANEL; Future  - METABOLIC PANEL, COMPREHENSIVE; Future  - TSH 3RD GENERATION  - T4, FREE  - THYROID ANTIBODY PANEL  - METABOLIC PANEL, COMPREHENSIVE    4.  Tobacco abuse: does not want to quit at this time      Sia Park MD

## 2021-04-06 NOTE — PATIENT INSTRUCTIONS
Take thyroid medication levothyroxine daily Take prozac daily Schedule appointment with therapist

## 2021-04-07 LAB
ALBUMIN SERPL-MCNC: 4.2 G/DL (ref 3.5–5)
ALBUMIN/GLOB SERPL: 1.5 {RATIO} (ref 1.1–2.2)
ALP SERPL-CCNC: 93 U/L (ref 45–117)
ALT SERPL-CCNC: 27 U/L (ref 12–78)
ANION GAP SERPL CALC-SCNC: 5 MMOL/L (ref 5–15)
AST SERPL-CCNC: 19 U/L (ref 15–37)
BILIRUB SERPL-MCNC: 0.5 MG/DL (ref 0.2–1)
BUN SERPL-MCNC: 14 MG/DL (ref 6–20)
BUN/CREAT SERPL: 22 (ref 12–20)
CALCIUM SERPL-MCNC: 8.8 MG/DL (ref 8.5–10.1)
CHLORIDE SERPL-SCNC: 107 MMOL/L (ref 97–108)
CHOLEST SERPL-MCNC: 251 MG/DL
CO2 SERPL-SCNC: 27 MMOL/L (ref 21–32)
CREAT SERPL-MCNC: 0.63 MG/DL (ref 0.55–1.02)
GLOBULIN SER CALC-MCNC: 2.8 G/DL (ref 2–4)
GLUCOSE SERPL-MCNC: 87 MG/DL (ref 65–100)
HDLC SERPL-MCNC: 80 MG/DL
HDLC SERPL: 3.1 {RATIO} (ref 0–5)
LDLC SERPL CALC-MCNC: 156.2 MG/DL (ref 0–100)
LIPID PROFILE,FLP: ABNORMAL
POTASSIUM SERPL-SCNC: 4.1 MMOL/L (ref 3.5–5.1)
PROT SERPL-MCNC: 7 G/DL (ref 6.4–8.2)
SODIUM SERPL-SCNC: 139 MMOL/L (ref 136–145)
T4 FREE SERPL-MCNC: 0.7 NG/DL (ref 0.8–1.5)
TRIGL SERPL-MCNC: 74 MG/DL (ref ?–150)
TSH SERPL DL<=0.05 MIU/L-ACNC: 24 UIU/ML (ref 0.36–3.74)
VLDLC SERPL CALC-MCNC: 14.8 MG/DL

## 2021-04-07 RX ORDER — LEVOTHYROXINE SODIUM 100 UG/1
100 TABLET ORAL
Qty: 90 TAB | Refills: 1 | Status: SHIPPED | OUTPATIENT
Start: 2021-04-07

## 2021-04-07 NOTE — PROGRESS NOTES
Thyroid is hypoactive. Will increase levothyroxine to 100 mcg daily if you have already failed to 50 mcg daily you can double up on the dose.   Please schedule follow-up for patient in 6 to 8 weeks

## 2021-04-08 LAB
THYROGLOB AB SERPL-ACNC: 110.6 IU/ML (ref 0–0.9)
THYROPEROXIDASE AB SERPL-ACNC: 429 IU/ML (ref 0–34)

## 2023-02-24 ENCOUNTER — TELEPHONE (OUTPATIENT)
Dept: INTERNAL MEDICINE CLINIC | Age: 68
End: 2023-02-24

## 2023-02-24 NOTE — TELEPHONE ENCOUNTER
Pt states she fell 2 wks ago and has hurt her left arm. Pt is asking for an order to get an x ray. Please call to advise if pt needs to be seen or if she can get the x ray. Thanks.

## 2023-02-28 ENCOUNTER — OFFICE VISIT (OUTPATIENT)
Dept: INTERNAL MEDICINE CLINIC | Age: 68
End: 2023-02-28
Payer: MEDICARE

## 2023-02-28 VITALS
HEART RATE: 77 BPM | HEIGHT: 62 IN | SYSTOLIC BLOOD PRESSURE: 133 MMHG | TEMPERATURE: 98.1 F | BODY MASS INDEX: 20.91 KG/M2 | OXYGEN SATURATION: 98 % | WEIGHT: 113.6 LBS | DIASTOLIC BLOOD PRESSURE: 79 MMHG | RESPIRATION RATE: 16 BRPM

## 2023-02-28 DIAGNOSIS — M25.512 ACUTE PAIN OF LEFT SHOULDER: Primary | ICD-10-CM

## 2023-02-28 PROCEDURE — G8427 DOCREV CUR MEDS BY ELIG CLIN: HCPCS | Performed by: STUDENT IN AN ORGANIZED HEALTH CARE EDUCATION/TRAINING PROGRAM

## 2023-02-28 PROCEDURE — 1101F PT FALLS ASSESS-DOCD LE1/YR: CPT | Performed by: STUDENT IN AN ORGANIZED HEALTH CARE EDUCATION/TRAINING PROGRAM

## 2023-02-28 PROCEDURE — G8510 SCR DEP NEG, NO PLAN REQD: HCPCS | Performed by: STUDENT IN AN ORGANIZED HEALTH CARE EDUCATION/TRAINING PROGRAM

## 2023-02-28 PROCEDURE — G8420 CALC BMI NORM PARAMETERS: HCPCS | Performed by: STUDENT IN AN ORGANIZED HEALTH CARE EDUCATION/TRAINING PROGRAM

## 2023-02-28 PROCEDURE — 99213 OFFICE O/P EST LOW 20 MIN: CPT | Performed by: STUDENT IN AN ORGANIZED HEALTH CARE EDUCATION/TRAINING PROGRAM

## 2023-02-28 PROCEDURE — 1090F PRES/ABSN URINE INCON ASSESS: CPT | Performed by: STUDENT IN AN ORGANIZED HEALTH CARE EDUCATION/TRAINING PROGRAM

## 2023-02-28 PROCEDURE — G8536 NO DOC ELDER MAL SCRN: HCPCS | Performed by: STUDENT IN AN ORGANIZED HEALTH CARE EDUCATION/TRAINING PROGRAM

## 2023-02-28 PROCEDURE — G8400 PT W/DXA NO RESULTS DOC: HCPCS | Performed by: STUDENT IN AN ORGANIZED HEALTH CARE EDUCATION/TRAINING PROGRAM

## 2023-02-28 PROCEDURE — 3017F COLORECTAL CA SCREEN DOC REV: CPT | Performed by: STUDENT IN AN ORGANIZED HEALTH CARE EDUCATION/TRAINING PROGRAM

## 2023-02-28 RX ORDER — DICLOFENAC SODIUM 10 MG/G
2 GEL TOPICAL 4 TIMES DAILY
Qty: 50 G | Refills: 0 | Status: SHIPPED | OUTPATIENT
Start: 2023-02-28

## 2023-02-28 NOTE — PROGRESS NOTES
Jaymie Wallace is a 74 y.o. female    Chief Complaint   Patient presents with    Fall       Visit Vitals  /79 (BP 1 Location: Right upper arm, BP Patient Position: Sitting, BP Cuff Size: Adult)   Pulse 77   Temp 98.1 °F (36.7 °C) (Oral)   Resp 16   Ht 5' 2\" (1.575 m)   Wt 113 lb 9.6 oz (51.5 kg)   LMP 12/16/2001   SpO2 98%   BMI 20.78 kg/m²           1. Have you been to the ER, urgent care clinic since your last visit? Hospitalized since your last visit? NO    2. Have you seen or consulted any other health care providers outside of the [address] [place] [address] since your last visit? Include any pap smears or colon screening.  NO

## 2023-02-28 NOTE — PROGRESS NOTES
[facility] in [facility]   Internal Medicine  [address] [facility] [address], [address] [facility]  [address] [facility], [address] [facility]  [phone]      Primary Care Visit Note    Assessment/Plan:     Diagnoses and all orders for this visit:    1. Acute pain of left shoulder-likely rotator cuff tendinopathy stemming from recent fall. Patient is made rapid improvement since fall and therefore would likely continue to improve with conservative management. Patient has had good pain relief with OTC Tylenol as needed. Offered physical therapy but patient unable to adhere to program as she is the caretaker for her . Given home physical therapy exercises and advised to take it easy at the gym with shoulder exercises. -     diclofenac (VOLTAREN) 1 % gel; Apply 2 g to affected area four (4) times daily. Vinnie De La Garza MD    CC:     Chief Complaint   Patient presents with    Fall       HPI:     Mathew Austin is a 74 y.o. female who presents for evaluation of left shoulder pain. Pt had an ice skating accident on Feb 10 at which time she fell on her whole left side. Patient was able to get up and walk after the accident and did not seek medical attention at that time. Area of greatest concern was her left shoulder as she initially couldn't get her arm over her head w/o significant pain, but now has improved with full range of motion and the shoulder and only some lingering pain in L shoulder with certain movements. She has no swelling or tenderness over the arm. Since the accident she has been going to the gym and doing an yoga and light weight lifting exercises with her arms.         ROS:   Constitutional: negative for fevers, chills, anorexia and weight loss  Eyes:   negative for visual disturbance and irritation  ENT:   negative for tinnitus,sore throat,nasal congestion,ear pain,hoarseness  Respiratory:  negative for cough, hemoptysis, dyspnea,wheezing  CV:   negative for chest pain, palpitations, lower extremity edema  GI:   negative for nausea, vomiting, diarrhea, abdominal pain,melena  Genitourinary: negative for frequency, dysuria and hematuria  Musculoskel: negative for myalgias, arthralgias, back pain, muscle weakness, joint pain  Neurological:  negative for headaches, dizziness, focal weakness, numbness  Psychiatric:     Negative for depression or anxiety        Past Medical History: Active Ambulatory Problems     Diagnosis Date Noted    Neck pain 12/16/2011    Back pain 12/16/2011    OCD (obsessive compulsive disorder) 02/11/2013     Resolved Ambulatory Problems     Diagnosis Date Noted    No Resolved Ambulatory Problems     Past Medical History:   Diagnosis Date    DDD (degenerative disc disease), cervical     Glaucoma, narrow-angle     Heart murmur     Lumbar spondylolysis     Menopause     Sun-damaged skin     Tanning bed exposure           Current Medications:   No current outpatient medications on file.       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   Tobacco Use    Smoking status: Every Day     Packs/day: 1.00     Years: 40.00     Pack years: 40.00     Types: Cigarettes    Smokeless tobacco: Never   Substance and Sexual Activity    Alcohol use: No     Alcohol/week: 0.0 standard drinks    Drug use: No    Sexual activity: Not Currently   Other Topics Concern    Not on file   Social History Narrative    Mom has dementia     Social Determinants of Health     Financial Resource Strain: Not on file   Food Insecurity: Not on file   Transportation Needs: Not on file   Physical Activity: Not on file   Stress: Not on file   Social Connections: Not on file   Intimate Partner Violence: Not on file   Housing Stability: Not on file            Visit Vitals  /79 (BP 1 Location: Right upper arm, BP Patient Position: Sitting, BP Cuff Size: Adult)   Pulse 77   Temp 98.1 °F (36.7 °C) (Oral)   Resp 16   Ht 5' 2\" (1.575 m)   Wt 113 lb 9.6 oz (51.5 kg)   LMP 12/16/2001   SpO2 98%   BMI 20.78 kg/m²       Physical Exam:   General - Well appearing   HEENT - eomi, non-icteric sclera  Pulm - normal wob  Cardio - hemodynamically stable  Abd - benign  Extrem - no edema  Neuro-  Alert and oriented, No focal deficits  MSK -full range of motion of the left shoulder with both passive and active movement . No tenderness, erythema, swelling.   Mild pain elicited with Ha and empty can test.

## 2023-03-09 DIAGNOSIS — M25.512 ACUTE PAIN OF LEFT SHOULDER: ICD-10-CM

## 2023-03-09 RX ORDER — DICLOFENAC SODIUM 10 MG/G
2 GEL TOPICAL 4 TIMES DAILY
Qty: 50 G | Refills: 0 | Status: SHIPPED | OUTPATIENT
Start: 2023-03-09

## 2023-03-09 NOTE — TELEPHONE ENCOUNTER
----- Message from [facility] sent at 3/9/2023  9:50 AM EST -----  Subject: Refill Request    QUESTIONS  Name of Medication? diclofenac (VOLTAREN) 1 % gel  Patient-reported dosage and instructions? 1% 4 times daily  How many days do you have left? Unknown  Preferred Pharmacy? [facility] [address] [facility] phone number (if available)? [phone]  ---------------------------------------------------------------------------  --------------  Andrea ALCANTAR  What is the best way for the office to contact you? OK to leave message on   voicemail  Preferred Call Back Phone Number? [phone]  ---------------------------------------------------------------------------  --------------  SCRIPT ANSWERS  Relationship to Patient?  Self

## 2023-03-09 NOTE — TELEPHONE ENCOUNTER
Future Appointments:  Future Appointments   Date Time Provider Aldair Cespedes   4/20/2023  9:30 AM Appprince Goode MD [facility] AMB        Last Appointment With Me:  Visit date not found     Requested Prescriptions     Pending Prescriptions Disp Refills    diclofenac (VOLTAREN) 1 % gel 50 g 0     Sig: Apply 2 g to affected area four (4) times daily.

## 2023-04-20 ENCOUNTER — HOSPITAL ENCOUNTER (OUTPATIENT)
Dept: GENERAL RADIOLOGY | Age: 68
Discharge: HOME | End: 2023-04-20
Payer: MEDICARE

## 2023-04-20 ENCOUNTER — OFFICE VISIT (OUTPATIENT)
Dept: INTERNAL MEDICINE CLINIC | Age: 68
End: 2023-04-20
Payer: MEDICARE

## 2023-04-20 VITALS
WEIGHT: 108 LBS | SYSTOLIC BLOOD PRESSURE: 111 MMHG | HEART RATE: 64 BPM | TEMPERATURE: 97.2 F | OXYGEN SATURATION: 97 % | HEIGHT: 62 IN | DIASTOLIC BLOOD PRESSURE: 64 MMHG | BODY MASS INDEX: 19.88 KG/M2 | RESPIRATION RATE: 16 BRPM

## 2023-04-20 DIAGNOSIS — G89.29 CHRONIC LEFT SHOULDER PAIN: ICD-10-CM

## 2023-04-20 DIAGNOSIS — M25.512 CHRONIC LEFT SHOULDER PAIN: ICD-10-CM

## 2023-04-20 DIAGNOSIS — Z72.0 TOBACCO ABUSE: ICD-10-CM

## 2023-04-20 DIAGNOSIS — Z12.31 ENCOUNTER FOR SCREENING MAMMOGRAM FOR MALIGNANT NEOPLASM OF BREAST: ICD-10-CM

## 2023-04-20 DIAGNOSIS — E78.00 HIGH CHOLESTEROL: ICD-10-CM

## 2023-04-20 DIAGNOSIS — Z12.11 SCREENING FOR COLON CANCER: ICD-10-CM

## 2023-04-20 DIAGNOSIS — Z00.00 MEDICARE ANNUAL WELLNESS VISIT, SUBSEQUENT: ICD-10-CM

## 2023-04-20 DIAGNOSIS — F33.1 MODERATE EPISODE OF RECURRENT MAJOR DEPRESSIVE DISORDER (HCC): Primary | ICD-10-CM

## 2023-04-20 LAB
ALBUMIN SERPL-MCNC: 4.2 G/DL (ref 3.5–5)
ALBUMIN/GLOB SERPL: 1.4 (ref 1.1–2.2)
ALP SERPL-CCNC: 74 U/L (ref 45–117)
ALT SERPL-CCNC: 26 U/L (ref 12–78)
ANION GAP SERPL CALC-SCNC: 2 MMOL/L (ref 5–15)
AST SERPL-CCNC: 18 U/L (ref 15–37)
BASOPHILS # BLD: 0 K/UL (ref 0–0.1)
BASOPHILS NFR BLD: 1 % (ref 0–1)
BILIRUB SERPL-MCNC: 0.4 MG/DL (ref 0.2–1)
BUN SERPL-MCNC: 14 MG/DL (ref 6–20)
BUN/CREAT SERPL: 17 (ref 12–20)
CALCIUM SERPL-MCNC: 9.7 MG/DL (ref 8.5–10.1)
CHLORIDE SERPL-SCNC: 107 MMOL/L (ref 97–108)
CHOLEST SERPL-MCNC: 236 MG/DL
CO2 SERPL-SCNC: 31 MMOL/L (ref 21–32)
CREAT SERPL-MCNC: 0.84 MG/DL (ref 0.55–1.02)
DIFFERENTIAL METHOD BLD: ABNORMAL
EOSINOPHIL # BLD: 0.1 K/UL (ref 0–0.4)
EOSINOPHIL NFR BLD: 1 % (ref 0–7)
ERYTHROCYTE [DISTWIDTH] IN BLOOD BY AUTOMATED COUNT: 14.1 % (ref 11.5–14.5)
GLOBULIN SER CALC-MCNC: 3.1 G/DL (ref 2–4)
GLUCOSE SERPL-MCNC: 96 MG/DL (ref 65–100)
HCT VFR BLD AUTO: 48.5 % (ref 35–47)
HDLC SERPL-MCNC: 76 MG/DL
HDLC SERPL: 3.1 (ref 0–5)
HGB BLD-MCNC: 15.1 G/DL (ref 11.5–16)
IMM GRANULOCYTES # BLD AUTO: 0 K/UL (ref 0–0.04)
IMM GRANULOCYTES NFR BLD AUTO: 0 % (ref 0–0.5)
LDLC SERPL CALC-MCNC: 143.6 MG/DL (ref 0–100)
LYMPHOCYTES # BLD: 1.1 K/UL (ref 0.8–3.5)
LYMPHOCYTES NFR BLD: 20 % (ref 12–49)
MCH RBC QN AUTO: 29.3 PG (ref 26–34)
MCHC RBC AUTO-ENTMCNC: 31.1 G/DL (ref 30–36.5)
MCV RBC AUTO: 94.2 FL (ref 80–99)
MONOCYTES # BLD: 0.5 K/UL (ref 0–1)
MONOCYTES NFR BLD: 9 % (ref 5–13)
NEUTS SEG # BLD: 3.9 K/UL (ref 1.8–8)
NEUTS SEG NFR BLD: 69 % (ref 32–75)
NRBC # BLD: 0 K/UL (ref 0–0.01)
NRBC BLD-RTO: 0 PER 100 WBC
PLATELET # BLD AUTO: 161 K/UL (ref 150–400)
PMV BLD AUTO: 11.6 FL (ref 8.9–12.9)
POTASSIUM SERPL-SCNC: 5.2 MMOL/L (ref 3.5–5.1)
PROT SERPL-MCNC: 7.3 G/DL (ref 6.4–8.2)
RBC # BLD AUTO: 5.15 M/UL (ref 3.8–5.2)
SODIUM SERPL-SCNC: 140 MMOL/L (ref 136–145)
TRIGL SERPL-MCNC: 82 MG/DL (ref ?–150)
VLDLC SERPL CALC-MCNC: 16.4 MG/DL
WBC # BLD AUTO: 5.7 K/UL (ref 3.6–11)

## 2023-04-20 PROCEDURE — 73030 X-RAY EXAM OF SHOULDER: CPT

## 2023-04-20 NOTE — PROGRESS NOTES
Ms. Donita Holm is presenting to follow up     CC:  Physical       HPI:    75 yo woman presenting for medicare wellness  She fell ice skating a couple of months ago and having pain in left shoulder   Saw Dr Ramez Trinidad February 28 and given exercises. Not improving. She is having pain extending her shoulder more than 90 degrees. She continues to go to the gym and do yoga and take classes.     Grieving ex  passed and she was caring for him  She will start a grieving program weekly at [employer]  She is down and sad but no suicidal thoughts  Going to the gym regularly    Deport mammogram overdue  Declines CT lung for screening lung cancer and declines dexa  Agress to cologuard    SMoker 1 pack a day   Denies pain in legs  Eclines all vaccines  Review of systems:  Constitutional: negative for fever, chills, weight loss, night sweats   Eyes : negative for vision changes, eye pain and discharge  Nose and Throat: negative for tinnitus, sore throat   Cardiovascular: negative for chest pain, palpitations and shortness of breath  Respiratory: negative for shortness of breath, cough and wheezing   Gastroinstestinal: negative for abdominal pain, nausea, vomiting, diarrhea, constipation, and blood in the stool  Musculoskeletal: See HPI  Genitourinary: negative for dysuria, nocturia, polyuria and hematuria   Neurologic: Negative for focal weakness, numbness or incoordination  Skin: negative for rash, pruritus  Hematologic: negative for easy bruising      Past Medical History:   Diagnosis Date    DDD (degenerative disc disease), cervical     Glaucoma, narrow-angle     Heart murmur     Lumbar spondylolysis     Menopause     OCD (obsessive compulsive disorder)     Sun-damaged skin     Tanning bed exposure         Past Surgical History:   Procedure Laterality Date    HX CERVICAL FUSION  2004    HX HYSTERECTOMY         Allergies   Allergen Reactions    Codeine Nausea and Vomiting    Pcn [Penicillins] Hives       Current Outpatient Medications on File Prior to Visit   Medication Sig Dispense Refill    diclofenac (VOLTAREN) 1 % gel Apply 2 g to affected area four (4) times daily. 50 g 0     No current facility-administered medications on file prior to visit. family history includes Diabetes in her father; Heart Attack in her father; Heart Disease in her father. 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   Tobacco Use    Smoking status: Every Day     Packs/day: 1.00     Years: 40.00     Pack years: 40.00     Types: Cigarettes     Passive exposure: Current    Smokeless tobacco: Never   Vaping Use    Vaping Use: Never used   Substance and Sexual Activity    Alcohol use: No     Alcohol/week: 0.0 standard drinks    Drug use: No    Sexual activity: Not Currently   Other Topics Concern    Not on file   Social History Narrative    Mom has dementia     Social Determinants of Health     Financial Resource Strain: Low Risk     Difficulty of Paying Living Expenses: Not hard at all   Food Insecurity: No Food Insecurity    Worried About Running Out of Food in the Last Year: Never true    Ran Out of Food in the Last Year: Never true   Transportation Needs: Not on file   Physical Activity: Not on file   Stress: Not on file   Social Connections: Not on file   Intimate Partner Violence: Not on file   Housing Stability: Not on file       Visit Vitals  /64 (BP 1 Location: Right upper arm, BP Patient Position: Sitting, BP Cuff Size: Adult)   Pulse 64   Temp 97.2 °F (36.2 °C) (Temporal)   Resp 16   Ht 5' 2\" (1.575 m)   Wt 108 lb (49 kg)   LMP 12/16/2001   SpO2 97%   BMI 19.75 kg/m²     General:  Well appearing female no acute distress  HEENT:   PERRL,normal conjunctiva.  External ear and canals normal, TMs normal.  Hearing normal to voice.  Nose without edema or discharge, normal septum. Lips, teeth, gums normal.  Oropharynx: no erythema, no exudates, no lesions, normal tongue. Neck:  Supple. Thyroid normal size, nontender, without nodules. No carotid bruit. No masses or lymphadenopathy  Respiratory: no respiratory distress,  no wheezing, no rhonchi, no rales. No chest wall tenderness. Cardiovascular:  RRR, normal S1S2, no murmur. Gastrointestinal: normal bowel sounds, soft, nontender, without masses. No hepatosplenomegaly. Extremities +2 pulses, no edema, normal sensation   Musculoskeletal:  Normal gait. Normal digits and nails. Normal strength and tone, no atrophy, and no abnormal movement. Left shoulder with normal inspection. Extension is limited to 90 degrees due to pain    Skin:  No rash, no lesions, no ulcers. Skin warm, normal turgor, without induration or nodules. Neuro:  A and OX4, fluent speech, cranial nerves normal 2-12. Sensation normal to light touch.   DTR symmetrical  Psych:  Normal affect      Lab Results   Component Value Date/Time    WBC 5.8 02/25/2020 12:09 PM    HGB 14.5 02/25/2020 12:09 PM    HCT 42.7 02/25/2020 12:09 PM    PLATELET 363 01/70/2980 12:09 PM    MCV 90 02/25/2020 12:09 PM     Lab Results   Component Value Date/Time    Sodium 139 04/06/2021 12:05 PM    Potassium 4.1 04/06/2021 12:05 PM    Chloride 107 04/06/2021 12:05 PM    CO2 27 04/06/2021 12:05 PM    Anion gap 5 04/06/2021 12:05 PM    Glucose 87 04/06/2021 12:05 PM    BUN 14 04/06/2021 12:05 PM    Creatinine 0.63 04/06/2021 12:05 PM    BUN/Creatinine ratio 22 (H) 04/06/2021 12:05 PM    GFR est AA >60 04/06/2021 12:05 PM    GFR est non-AA >60 04/06/2021 12:05 PM    Calcium 8.8 04/06/2021 12:05 PM     Lab Results   Component Value Date/Time    Cholesterol, total 251 (H) 04/06/2021 12:05 PM    HDL Cholesterol 80 04/06/2021 12:05 PM    LDL, calculated 156.2 (H) 04/06/2021 12:05 PM    VLDL, calculated 14.8 04/06/2021 12:05 PM    Triglyceride 74 04/06/2021 12:05 PM    CHOL/HDL Ratio 3.1 04/06/2021 12:05 PM     Lab Results   Component Value Date/Time    TSH 24.00 (H) 04/06/2021 12:05 PM     Lab Results   Component Value Date/Time    Hemoglobin A1c 5.9 (H) 11/03/2010 09:26 AM     No results found for: VITD3, XQVID2, XQVID3, XQVID, VD3RIA                Assessment and Plan:     1. Moderate episode of recurrent major depressive disorder ([address]  -She is grieving  ex- passed away. She was main caretaker. She is going to start attending a grief group  She does not want medication.    - METABOLIC PANEL, COMPREHENSIVE; Future  - CBC WITH AUTOMATED DIFF; Future    2. Screening for colon cancer  - COLOGUARD TEST (FECAL DNA COLORECTAL CANCER SCREENING)    3. Encounter for screening mammogram for malignant neoplasm of breast  Overdue  - BETTE MAMMO BI SCREENING INCL CAD; Future    4. Medicare annual wellness visit, subsequent  -Declines all vaccines, declines CT scan to screen for lung cancer she understands the risk of cancers hydrocodone smoking history. 5. Chronic left shoulder pain-suspect rotator cuff tendinitis  - XR SHOULDER LT AP/LAT MIN 2 V; Future  - REFERRAL TO ORTHOPEDICS  - REFERRAL TO PHYSICAL THERAPY    6. High cholesterol  - METABOLIC PANEL, COMPREHENSIVE; Future  - CBC WITH AUTOMATED DIFF; Future  - LIPID PANEL; Future     Follow-up and Dispositions    Return in about 1 year (around 4/20/2024) for annual exam.          Bubba Tinajero MDThis is the Subsequent Medicare Annual Wellness Exam, performed 12 months or more after the Initial AWV or the last Subsequent AWV    I have reviewed the patient's medical history in detail and updated the computerized patient record. Assessment/Plan   Education and counseling provided:    1. Moderate episode of recurrent major depressive disorder (HCC)  -     METABOLIC PANEL, COMPREHENSIVE; Future  -     CBC WITH AUTOMATED DIFF; Future  2.  Screening for colon cancer  -     COLOGUARD TEST (FECAL DNA COLORECTAL CANCER SCREENING)  3. Encounter for screening mammogram for malignant neoplasm of breast  -     BETTE MAMMO BI SCREENING INCL CAD; Future  4. Medicare annual wellness visit, subsequent  5. Chronic left shoulder pain  -     XR SHOULDER LT AP/LAT MIN 2 V; Future  -     REFERRAL TO ORTHOPEDICS  -     REFERRAL TO PHYSICAL THERAPY  6. High cholesterol  -     METABOLIC PANEL, COMPREHENSIVE; Future  -     CBC WITH AUTOMATED DIFF; Future  -     LIPID PANEL; Future  7. Tobacco abuse       Depression Risk Factor Screening     3 most recent PHQ Screens 2/28/2023   PHQ Not Done -   Little interest or pleasure in doing things Not at all   Feeling down, depressed, irritable, or hopeless Not at all   Total Score PHQ 2 0   Trouble falling or staying asleep, or sleeping too much -   Feeling tired or having little energy -   Poor appetite, weight loss, or overeating -   Feeling bad about yourself - or that you are a failure or have let yourself or your family down -   Trouble concentrating on things such as school, work, reading, or watching TV -   Moving or speaking so slowly that other people could have noticed; or the opposite being so fidgety that others notice -   Thoughts of being better off dead, or hurting yourself in some way -   PHQ 9 Score -   How difficult have these problems made it for you to do your work, take care of your home and get along with others -       Alcohol & Drug Abuse Risk Screen    Do you average more than 1 drink per night or more than 7 drinks a week:  No    On any one occasion in the past three months have you have had more than 3 drinks containing alcohol:  No          Functional Ability and Level of Safety    Hearing: Hearing is good. Activities of Daily Living: The home contains: no safety equipment. Patient does total self care      Ambulation: with no difficulty     Fall Risk:  Fall Risk Assessment, last 12 mths 4/20/2023   Able to walk? Yes   Fall in past 12 months?  1   Do you feel unsteady? 0   Are you worried about falling 0   Is TUG test greater than 12 seconds? 0   Is the gait abnormal? 1   Number of falls in past 12 months 1   Fall with injury? 1      Abuse Screen:  Patient is not abused       Cognitive Screening    Has your family/caregiver stated any concerns about your memory: no     Cognitive Screening: Normal - Verbal Fluency Test    Health Maintenance Due     Health Maintenance Due   Topic Date Due    Pneumococcal 65+ years (1 - PCV) Never done    DTaP/Tdap/Td series (1 - Tdap) Never done    Shingles Vaccine (1 of 2) Never done    Low dose CT lung screening  Never done    Bone Densitometry (Dexa) Screening  Never done    Colorectal Cancer Screening Combo  07/16/2022    Breast Cancer Screen Mammogram  07/22/2022       Patient Care Team   Patient Care Team:  Leora Duarte MD as PCP - General (Internal Medicine Physician)  Leora Duarte MD as PCP - [facility] Empaneled Provider    History     Patient Active Problem List   Diagnosis Code    Neck pain M54.2    Back pain M54.9    OCD (obsessive compulsive disorder) F42.9    Moderate episode of recurrent major depressive disorder ([facility] [address] F33.1     Past Medical History:   Diagnosis Date    DDD (degenerative disc disease), cervical     Glaucoma, narrow-angle     Heart murmur     Lumbar spondylolysis     Menopause     OCD (obsessive compulsive disorder)     Sun-damaged skin     Tanning bed exposure       Past Surgical History:   Procedure Laterality Date    HX CERVICAL FUSION  2004    HX HYSTERECTOMY       Current Outpatient Medications   Medication Sig Dispense Refill    diclofenac (VOLTAREN) 1 % gel Apply 2 g to affected area four (4) times daily.  50 g 0     Allergies   Allergen Reactions    Codeine Nausea and Vomiting    Pcn [Penicillins] Hives       Family History   Problem Relation Age of Onset    Diabetes Father     Heart Disease Father     Heart Attack Father      Social History     Tobacco Use    Smoking status: Every Day Packs/day: 1.00     Years: 40.00     Pack years: 40.00     Types: Cigarettes     Passive exposure: Current    Smokeless tobacco: Never   Substance Use Topics    Alcohol use: No     Alcohol/week: 0.0 standard drinks         Kailey Goldstein MD

## 2023-04-20 NOTE — PATIENT INSTRUCTIONS
Schedule mammogram and cologuard    Medicare Wellness Visit, Female     The best way to live healthy is to have a lifestyle where you eat a well-balanced diet, exercise regularly, limit alcohol use, and quit all forms of tobacco/nicotine, if applicable. Regular preventive services are another way to keep healthy. Preventive services (vaccines, screening tests, monitoring & exams) can help personalize your care plan, which helps you manage your own care. Screening tests can find health problems at the earliest stages, when they are easiest to treat. [facility] follows the current, evidence-based guidelines published by the [facility] ([facility]) when recommending preventive services for our patients. Because we follow these guidelines, sometimes recommendations change over time as research supports it. (For example, mammograms used to be recommended annually. Even though Medicare will still pay for an annual mammogram, the newer guidelines recommend a mammogram every two years for women of average risk). Of course, you and your doctor may decide to screen more often for some diseases, based on your risk and your co-morbidities (chronic disease you are already diagnosed with). Preventive services for you include:  - Medicare offers their members a free annual wellness visit, which is time for you and your primary care provider to discuss and plan for your preventive service needs.  Take advantage of this benefit every year!    -Over the age of 72 should receive the recommended pneumonia vaccines.    -All adults should have a flu vaccine yearly.  -All adults should have a tetanus vaccine every 10 years.   -Over the age 48 should receive the shingles vaccines.        -All adults should be screened once for Hepatitis C.  -All adults age 38-68 who are overweight should have a diabetes screening test once every three years.   -Other screening tests and preventive services for persons with diabetes include: an eye exam to screen for diabetic retinopathy, a kidney function test, a foot exam, and stricter control over your cholesterol.   -Cardiovascular screening for adults with routine risk involves an electrocardiogram (ECG) at intervals determined by your doctor.     -Colorectal cancer screenings should be done for adults age 39-70 with no increased risk factors for colorectal cancer. There are a number of acceptable methods of screening for this type of cancer. Each test has its own benefits and drawbacks. Discuss with your doctor what is most appropriate for you during your annual wellness visit. The different tests include: colonoscopy (considered the best screening method), a fecal occult blood test, a fecal DNA test, and sigmoidoscopy.    -Lung cancer screening is recommended annually with a low dose CT scan for adults between age 54 and 68, who have smoked at least 30 pack years (equivalent of 1 pack per day for 30 days), and who is a current smoker or quit less than 15 years ago.    -A bone mass density test is recommended when a woman turns 65 to screen for osteoporosis. This test is only recommended one time, as a screening. Some providers will use this same test as a disease monitoring tool if you already have osteoporosis. -Breast cancer screenings are recommended every other year for women of normal risk, age 54-69.    -Cervical cancer screenings for women over age 72 are only recommended with certain risk factors.      Here is a list of your current Health Maintenance items (your personalized list of preventive services) with a due date:  Health Maintenance Due   Topic Date Due    Pneumococcal Vaccine (1 - PCV) Never done    DTaP/Tdap/Td  (1 - Tdap) Never done    Shingles Vaccine (1 of 2) Never done    Smoker or Former Smoker - [address]  Never done    Bone Mineral Density   Never done    Colorectal Screening  07/16/2022 Mammogram  07/22/2022

## 2023-04-20 NOTE — PROGRESS NOTES
Chief Complaint   Patient presents with    Physical          1. \"Have you been to the ER, urgent care clinic since your last visit? Hospitalized since your last visit? \" No    2. \"Have you seen or consulted any other health care providers outside of the [address] [place] [address] since your last visit? \" No     3. For patients aged 39-70: Has the patient had a colonoscopy / FIT/ Cologuard? Yes, no care gaps. If the patient is female:    4. For patients aged 41-77: Has the patient had a mammogram within the past 2 years? No      5. For patients aged 21-65: Has the patient had a pap smear?  No

## 2023-04-20 NOTE — PROGRESS NOTES
Some elevation of the humeral head which could mean a rotator cuff issue  Recommend that you see ortho  Referral given during visit

## 2023-04-23 ENCOUNTER — TRANSCRIBE ORDERS (OUTPATIENT)
Facility: HOSPITAL | Age: 68
End: 2023-04-23

## 2023-04-23 DIAGNOSIS — Z12.31 ENCOUNTER FOR SCREENING MAMMOGRAM FOR MALIGNANT NEOPLASM OF BREAST: Primary | ICD-10-CM

## 2023-04-26 ENCOUNTER — HOSPITAL ENCOUNTER (OUTPATIENT)
Dept: PHYSICAL THERAPY | Age: 68
Discharge: HOME | End: 2023-04-26
Payer: MEDICARE

## 2023-04-26 PROCEDURE — 97161 PT EVAL LOW COMPLEX 20 MIN: CPT | Performed by: PHYSICAL THERAPIST

## 2023-04-26 PROCEDURE — 97110 THERAPEUTIC EXERCISES: CPT | Performed by: PHYSICAL THERAPIST

## 2023-04-26 NOTE — PROGRESS NOTES
PT INITIAL EVALUATION NOTE - [facility] 2-15    Patient Name: Flint Lanes  Date:2023  : 1955  [x]  Patient  Verified  Payor: Claire Hernandez Mojgan / Plan: [facility] COMMUNITY PLAN MCR / Product Type: Managed Care Medicare /    In time: 6414  Out time: ***  Total Treatment Time (min): ***  Total Timed Codes (min): ***  1:1 Treatment Time ( only): ***   Visit #: 1     Treatment Area: Pain in left shoulder [M25.512]    SUBJECTIVE  Pain Level (0-10 scale): 6-7  Any medication changes, allergies to medications, adverse drug reactions, diagnosis change, or new procedure performed?: [] No    [x] Yes (see summary sheet for update)  Subjective:    Pt reports that she fell about 1 month ago while ice skating. She is unsure of how she landed but believes that it was onto her left shoulder. She is typically very active at the gym and with yoga about 3 times a week. She thought this pain would subside but it has remained. She is unable to sleep on the left side. She has had a negative x-ray. She has already seen Xavier Teresa who has sent her to PT. She lives at home alone after losing her  2-3 weeks ago. She is struggling to lift day to day items around the house. States that lifting the milk out of the fridge is challenging. She has always relied on going to the gym for physical and mental well being, but this has limited her ability to be there in the capacity she would like to.            OBJECTIVE/EXAMINATION  OBJECTIVE  Posture:  rounded shoulders and forward head  Other Observations:  NA  Functional  and Pinch:  NA  Palpation: denies tenderness to palpation    left Shoulder ROM:  AROM   PROM   Flexion   150   ***   Extension  ***   ***   Abduction  155   ***   Adduction  ***   ***   IR   Hand to ***  ***   ER   Hand to ***  ***    Right Shoulder AROM: full          Joint Mobility Assessment: Glenohumeral: ***      Acromioclavicular: ***      Sternoclavicular: ***    Flexibility: ***    UPPER QUARTER   MUSCLE STRENGTH  KEY       R  L  0 - No Contraction   Flexion  ***  ***  1 - Trace    Extension ***  ***  2 - Poor    Abduction ***  ***  3 - Fair     IR  ***  ***  4 - Good    ER  ***  ***  5 - Normal       Neurological: Reflexes / Sensations: ***  Special Tests: Neer Impingement: ***  Ha-Jean: ***      Scapular Reposition: ***  Shoulder Abduction: ***     Crank: ***    Load and Shift: ***    [place]: ***    Apprehension: ***    Relocation : ***   Speed's: ***    Yergason: ***    AC Compression: ***    AC Crossover: ***     Modality rationale: {BSHSI INMOTION MODALITIES:67122} to improve the patients ability to ***   Min Type Additional Details    [] Estim: []Att   []Unatt        []TENS instruct                  []IFC  []Premod   []NMES                     []Other:  []w/US   []w/ice   []w/heat  Position:  Location:    []  Traction: [] Cervical       []Lumbar                       [] Prone          []Supine                       []Intermittent   []Continuous Lbs:  [] before manual  [] after manual  []w/heat    []  Ultrasound: []Continuous   [] Pulsed at:                           []1MHz   []3MHz Location:  W/cm2:    [] Paraffin         Location:   []w/heat    []  Ice     []  Heat  []  Ice massage Position:  Location:    []  Laser  []  Other: Position:  Location:      []  Vasopneumatic Device Pressure:       [] lo [] med [] hi   Temperature:      [x] Skin assessment post-treatment:  [x]intact []redness- no adverse reaction    []redness - adverse reaction:     *** min Therapeutic Exercise:  [] See flow sheet :   Rationale: {BSHSI IMMOTION THER EX:00967:a} to improve the patients ability to ***    *** min Therapeutic Activity:  []  See flow sheet :   Rationale: {BSHSI IMMOTION THER EX:93602:a}  to improve the patients ability to ***     *** min Neuromuscular Re-education:  []  See flow sheet :   Rationale: {BSHSI IMMOTION THER EX:41429:a}  to improve the patients ability to ***    *** min Self Care/Home Management:  []  See flow sheet :   Rationale: {BSHSI IMMOTION THER EX:82703:a}  to improve the patients ability to ***    *** min Manual Therapy: ***    Rationale: {BSHSI IMMOTION MANUAL THERAPY:99012:a} to improve the patients ability to ***    *** min Gait Training:  ___ feet with ___ device on level surfaces with ___ level of assist   Rationale: {BSHSI IMMOTION THER EX:03397:a}  to improve the patients ability to ***          With   [] TE   [] TA   [] Neuro   [] SC   [] other: Patient Education: [x] Review HEP    [] Progressed/Changed HEP based on:   [] positioning   [] body mechanics   [] transfers   [] heat/ice application    [] other:      Other Objective/Functional Measures: FOTO Functional Measure: ***/100              ***           Pain Level (0-10 scale) post treatment: ***    ASSESSMENT/Changes in Function:     [x]  See Plan of Llu[place], PT 4/26/2023

## 2023-05-01 ENCOUNTER — HOSPITAL ENCOUNTER (OUTPATIENT)
Dept: PHYSICAL THERAPY | Age: 68
Discharge: HOME | End: 2023-05-01
Payer: MEDICARE

## 2023-05-01 PROCEDURE — 97110 THERAPEUTIC EXERCISES: CPT

## 2023-05-01 PROCEDURE — 97140 MANUAL THERAPY 1/> REGIONS: CPT

## 2023-05-01 NOTE — PROGRESS NOTES
PT DAILY TREATMENT NOTE - [facility] 2-15    Patient Name: Shantal Silva  Date:2023  : 1955  [x]  Patient  Verified  Payor: [facility] MEDICARE / Plan: [facility] COMMUNITY PLAN MCR / Product Type: Managed Care Medicare /    In time: 207 P  Out time: 242 P  Total Treatment Time (min): 35  Total Timed Codes (min): 30  1:1 Treatment Time ( only): 25   Visit #:  2    Treatment Area: Pain in left shoulder [M25.512]    SUBJECTIVE  Pain Level (0-10 scale): 5/10  Any medication changes, allergies to medications, adverse drug reactions, diagnosis change, or new procedure performed?: [x] No    [] Yes (see summary sheet for update)  Subjective functional status/changes:   [] No changes reported  Patients no significant change in sx; has been performing HEP, feels may have aggravated sx     OBJECTIVE    Modality rationale: decrease inflammation and decrease pain to improve the patients ability to perform ADLs   Min Type Additional Details       [] Estim: []Att   []Unatt    []TENS instruct                  []IFC  []Premod   []NMES                     []Other:  []w/US   []w/ice   []w/heat  Position:  Location:       []  Traction: [] Cervical       []Lumbar                       [] Prone          []Supine                       []Intermittent   []Continuous Lbs:  [] before manual  [] after manual  []w/heat    []  Ultrasound: []Continuous   [] Pulsed                       at: []1MHz   []3MHz Location:  W/cm2:    [] Paraffin         Location:   []w/heat   declined [x]  Ice     []  Heat  []  Ice massage Position:  Location: L shldr     []  Laser  []  Other: Position:  Location:      []  Vasopneumatic Device Pressure:       [] lo [] med [] hi   Temperature:      [x] Skin assessment post-treatment:  [x]intact []redness- no adverse reaction    []redness - adverse reaction:     20 min Therapeutic Exercise:  [x] See flow sheet : review HEP   Rationale: increase ROM, increase strength, improve coordination, improve balance, and increase proprioception to improve the patients ability to perform ADLs    10 min Manual Therapy: STM L UT, lev scap mm., PROM L shldr flex, ER, IR; A/I flex/ext at 90 degrees flex, IR/ER at 45 degrees abd     Rationale: decrease pain, increase ROM, increase tissue extensibility, and increase postural awareness to improve the patients ability to perform ADLs          With   [x] TE   [] TA   [] Neuro   [] SC   [] other: Patient Education: [x] Review HEP    [] Progressed/Changed HEP based on:   [] positioning   [] body mechanics   [] transfers   [] heat/ice application    [] other:      Other Objective/Functional Measures:      Pain Level (0-10 scale) post treatment: 4/10    ASSESSMENT/Changes in Function:     Guarded w/ PROM initially, improved w/ repetition; manual/verbal cueing for proper technique for HEP     Patient will continue to benefit from skilled PT services to modify and progress therapeutic interventions, address functional mobility deficits, address ROM deficits, address strength deficits, analyze and address soft tissue restrictions, analyze and cue movement patterns, analyze and modify body mechanics/ergonomics, and assess and modify postural abnormalities to attain remaining goals. [x]  See Plan of Care  []  See progress note/recertification  []  See Discharge Summary         Progress towards goals / Updated goals:  Short Term Goals: To be accomplished in 8-10 treatments:              Pt will be consistent and demonstrate I with HEP  Pt will complain of pain 3-4/10 with all activity  Pt will demonstrate improved ROM to complete all ADL's more efficiently  Pt will be able to self correct and maintain proper posture for 15-20 minutes without deviation     Long Term Goals:  To be accomplished in 20-24 treatments:              Pt will complain of pain 0-1/10 with all activity  Pt will increase strength to stabilize the shoulder and maintain proper posture with all activity  Pt will increase FOTO score by 4 points to [facility] and demonstrate improved function with all activity  Pt will return to all gym and yoga activity  Pt will be able to lift 10 lbs with proper form and no complaints of pain    PLAN  [x]  Upgrade activities as tolerated     [x]  Continue plan of care  []  Update interventions per flow sheet       []  Discharge due to:_  []  Other:_      Fredrick Calderon, PT 5/1/2023

## 2023-05-03 ENCOUNTER — APPOINTMENT (OUTPATIENT)
Dept: PHYSICAL THERAPY | Age: 68
End: 2023-05-03
Payer: MEDICARE

## 2023-05-03 ENCOUNTER — HOSPITAL ENCOUNTER (OUTPATIENT)
Dept: PHYSICAL THERAPY | Age: 68
Discharge: HOME | End: 2023-05-03
Payer: MEDICARE

## 2023-05-03 PROCEDURE — 97140 MANUAL THERAPY 1/> REGIONS: CPT | Performed by: PHYSICAL THERAPIST

## 2023-05-03 PROCEDURE — 97110 THERAPEUTIC EXERCISES: CPT | Performed by: PHYSICAL THERAPIST

## 2023-05-15 ENCOUNTER — TELEPHONE (OUTPATIENT)
Age: 68
End: 2023-05-15

## 2023-05-17 ENCOUNTER — HOSPITAL ENCOUNTER (OUTPATIENT)
Facility: HOSPITAL | Age: 68
Setting detail: RECURRING SERIES
Discharge: HOME | End: 2023-05-20
Payer: MEDICAID

## 2023-05-17 PROCEDURE — 97110 THERAPEUTIC EXERCISES: CPT

## 2023-05-17 PROCEDURE — 97140 MANUAL THERAPY 1/> REGIONS: CPT

## 2023-05-17 NOTE — PROGRESS NOTES
PHYSICAL THERAPY - MEDICARE DAILY TREATMENT NOTE (updated 3/23)      Date: 2023          Patient Name:  Zhane Catherine :  1955   Medical   Diagnosis:  Left shoulder pain Treatment Diagnosis:  M25.512  LEFT SHOULDER PAIN    Referral Source:  Dr. Cally Cason:   Payor: Chaya Kocher / Plan: [employer]ter Ramirez / Product Type: *No Product type* /                     Patient  verified yes     Visit #   Current  / Total 4 24   Time   In / Out 105 151   Total Treatment Time 46   Total Timed Codes 46   1:1 Treatment Time 55      [facility] Totals Reminder:  bill using total billable   min of TIMED therapeutic procedures and modalities. 8-22 min = 1 unit; 23-37 min = 2 units; 38-52 min = 3 units; 53-67 min = 4 units; 68-82 min = 5 units            SUBJECTIVE    Pain Level (0-10 scale): 6    Any medication changes, allergies to medications, adverse drug reactions, diagnosis change, or new procedure performed?: [x] No    [] Yes (see summary sheet for update)  Medications: Verified on Patient Summary List    Subjective functional status/changes:     Pt reports that she has ongoing pain. She continues to attend all of her classes at the gym and uses weights in those classes despite reporting that this causes her to be sore. She does report making modifications in weight if the pain is too much. OBJECTIVE      Therapeutic Procedures: Tx Min Billable or 1:1 Min (if diff from Tx Min) Procedure, Rationale, Specifics        34 34 [id number] Therapeutic Exercise (timed):  increase ROM, strength, coordination, balance, and proprioception to improve patient's ability to progress to PLOF and address remaining functional goals.  (see flow sheet as applicable)     Details if applicable:      07010 Manual Therapy (timed):  decrease pain, increase ROM, increase tissue extensibility, decrease trigger points, and increase postural awareness to improve patient's ability to progress to

## 2023-05-18 ENCOUNTER — HOSPITAL ENCOUNTER (OUTPATIENT)
Facility: HOSPITAL | Age: 68
Discharge: HOME | End: 2023-05-18
Payer: MEDICAID

## 2023-05-18 DIAGNOSIS — Z12.31 ENCOUNTER FOR SCREENING MAMMOGRAM FOR MALIGNANT NEOPLASM OF BREAST: ICD-10-CM

## 2023-05-18 PROCEDURE — 77067 SCR MAMMO BI INCL CAD: CPT

## 2023-05-24 ENCOUNTER — HOSPITAL ENCOUNTER (OUTPATIENT)
Facility: HOSPITAL | Age: 68
Setting detail: RECURRING SERIES
Discharge: HOME | End: 2023-05-27
Payer: MEDICAID

## 2023-05-24 PROCEDURE — 97140 MANUAL THERAPY 1/> REGIONS: CPT

## 2023-05-24 PROCEDURE — 97110 THERAPEUTIC EXERCISES: CPT

## 2023-05-24 NOTE — PROGRESS NOTES
PHYSICAL THERAPY - MEDICARE DAILY TREATMENT NOTE (updated 3/23)      Date: 2023          Patient Name:  Jessenia Crowe :  1955   Medical   Diagnosis:  Left shoulder pain Treatment Diagnosis:  M25.512  LEFT SHOULDER PAIN    Referral Source:  Dr. Srinivas June:   Payor: Zuhair Matt / Plan: Travis Mcclellan / Product Type: *No Product type* /                     Patient  verified yes     Visit #   Current  / Total 5 24   Time   In / Out 1210 104   Total Treatment Time 54   Total Timed Codes 54   1:1 Treatment Time 39      [facility] Totals Reminder:  bill using total billable   min of TIMED therapeutic procedures and modalities. 8-22 min = 1 unit; 23-37 min = 2 units; 38-52 min = 3 units; 53-67 min = 4 units; 68-82 min = 5 units            SUBJECTIVE    Pain Level (0-10 scale): 6    Any medication changes, allergies to medications, adverse drug reactions, diagnosis change, or new procedure performed?: [x] No    [] Yes (see summary sheet for update)  Medications: Verified on Patient Summary List    Subjective functional status/changes:       Patient noted they continue to feel increased amounts of irritation, especially with external rotation. Patient also noted they have continued to go to their gym classes and noted \"it depends what I'm doing,\" but continues to note irritation with certain exercises. OBJECTIVE      Therapeutic Procedures: Tx Min Billable or 1:1 Min (if diff from Tx Min) Procedure, Rationale, Specifics        44 26 [id number] Therapeutic Exercise (timed):  increase ROM, strength, coordination, balance, and proprioception to improve patient's ability to progress to PLOF and address remaining functional goals.  (see flow sheet as applicable)     Details if applicable:     10 10 70159 Manual Therapy (timed):  decrease pain, increase ROM, increase tissue extensibility, decrease trigger points, and increase postural awareness to improve patient's ability

## 2023-05-31 ENCOUNTER — HOSPITAL ENCOUNTER (OUTPATIENT)
Facility: HOSPITAL | Age: 68
Setting detail: RECURRING SERIES
Discharge: HOME | End: 2023-06-03
Payer: MEDICAID

## 2023-05-31 PROCEDURE — 97110 THERAPEUTIC EXERCISES: CPT

## 2023-05-31 NOTE — PROGRESS NOTES
[address] Physical Therapy  [address] [facility] ([place]), [address]  Phone: [phone]   Fax: [phone]     PHYSICAL THERAPY PROGRESS NOTE  Patient Name:  Eric Delatorre :  1955   Treatment/Medical Diagnosis:  M25.512  LEFT SHOULDER PAIN    Referral Source:  Dr. Dallin Magallon     Date of Initial Visit:   Attended Visits:  6 Missed Visits:  0     SUMMARY OF TREATMENT/ASSESSMENT:  Patient is a 74year old being seen for her left shoulder following a fall ice skating. Patient has been seen for 6 visits and has been treated using manual therapy and therapeutic exercises to make improvements with upper quarter muscular strength, left shoulder AROM, and subjective improvements in symptoms . CURRENT STATUS  Patient is a 74year old being seen for her left shoulder following a fall ice skating. Patient has been seen for 6 visits and has made improvements with upper quarter muscular strength, left shoulder AROM, and subjective improvements in symptoms . Patient demonstrated improved global upper quarter strength during their reassessment today, also noted decreased amounts of pain during MMT. Patient noted improved AROM with flexion (171 degrees v 155 degrees previously), abduction (180 degrees v 155 degrees previously) and with active ER/IR during today's reassessment. Patient has recently noted improvements in strength, pain symptoms, and ROM in the past week or so. Patient has continued to attend their gym classes, but noted if they do anything they have been using either the lowest weight available or no weight at all. Patient noted since decreasing intensity at their gym classes they have been experiencing decreased pain symptoms and have been focusing on their PT exercises more. Patient has achieved 2/9 goals set for treatment with progress being made towards final goals for rehab.      Patient will continue to benefit from
a fall ice skating. Patient has been seen for 6 visits and has made improvements with upper quarter muscular strength, left shoulder AROM, and subjective improvements in symptoms . Patient demonstrated improved global upper quarter strength during their reassessment today, also noted decreased amounts of pain during MMT. Patient noted improved AROM with flexion (171 degrees v 155 degrees previously), abduction (180 degrees v 155 degrees previously) and with active ER/IR during today's reassessment. Patient has recently noted improvements in strength, pain symptoms, and ROM in the past week or so. Patient has continued to attend their gym classes, but noted if they do anything they have been using either the lowest weight available or no weight at all. Patient noted since decreasing intensity at their gym classes they have been experiencing decreased pain symptoms and have been focusing on their PT exercises more. Patient has achieved 2/9 goals set for treatment with progress being made towards final goals for rehab. Patient will continue to benefit from skilled PT / OT services to modify and progress therapeutic interventions, analyze and address functional mobility deficits, analyze and address ROM deficits, analyze and address strength deficits, analyze and address soft tissue restrictions, analyze and cue for proper movement patterns, analyze and modify for postural abnormalities, analyze and address imbalance/dizziness, and instruct in home and community integration to address functional deficits and attain remaining goals. Progress toward goals / Updated goals:  []  See Progress Note/Recertification    Short Term Goals:  To be accomplished in 8-10 treatments:              Pt will be consistent and demonstrate I with HEP-MET-->2x/week  Pt will complain of pain 3-4/10 with all activity- 4-5/10 at most  Pt will demonstrate improved ROM to complete all ADL's more efficiently-MET  Pt will be able to self

## 2023-06-05 ENCOUNTER — HOSPITAL ENCOUNTER (OUTPATIENT)
Facility: HOSPITAL | Age: 68
Setting detail: RECURRING SERIES
Discharge: HOME | End: 2023-06-08
Payer: MEDICAID

## 2023-06-05 PROCEDURE — 97140 MANUAL THERAPY 1/> REGIONS: CPT

## 2023-06-05 PROCEDURE — 97110 THERAPEUTIC EXERCISES: CPT

## 2023-06-05 NOTE — PROGRESS NOTES
PHYSICAL THERAPY - MEDICARE DAILY TREATMENT NOTE (updated 3/23)      Date: 2023          Patient Name:  Mariana Litten :  1955   Medical   Diagnosis:  Left shoulder pain Treatment Diagnosis:  M25.512  LEFT SHOULDER PAIN    Referral Source:  Dr. Rolando Jacobs:   Payor: Florian Crosser / Plan: [employer] / Product Type: *No Product type* /                     Patient  verified yes     Visit #   Current  / Total 7 24   Time   In / Out 1208 104   Total Treatment Time 56   Total Timed Codes 56   1:1 Treatment Time 33      [facility] Totals Reminder:  bill using total billable   min of TIMED therapeutic procedures and modalities. 8-22 min = 1 unit; 23-37 min = 2 units; 38-52 min = 3 units; 53-67 min = 4 units; 68-82 min = 5 units            SUBJECTIVE    Pain Level (0-10 scale): 4/10    Any medication changes, allergies to medications, adverse drug reactions, diagnosis change, or new procedure performed?: [x] No    [] Yes (see summary sheet for update)  Medications: Verified on Patient Summary List    Subjective functional status/changes:       Patient noted they were very sore after last treatment session, noted they did not do too many of their exercises because they were very sore and didn't want to push it too much. OBJECTIVE      Therapeutic Procedures: Tx Min Billable or 1:1 Min (if diff from Tx Min) Procedure, Rationale, Specifics        46 23 [id number] Therapeutic Exercise (timed):  increase ROM, strength, coordination, balance, and proprioception to improve patient's ability to progress to PLOF and address remaining functional goals. (see flow sheet as applicable)     Details if applicable:     10 10 19222 Manual Therapy (timed):  decrease pain, increase ROM, increase tissue extensibility, decrease trigger points, and increase postural awareness to improve patient's ability to progress to PLOF and address remaining functional goals.   The manual therapy

## 2023-06-07 ENCOUNTER — HOSPITAL ENCOUNTER (OUTPATIENT)
Facility: HOSPITAL | Age: 68
Setting detail: RECURRING SERIES
Discharge: HOME | End: 2023-06-10
Payer: MEDICAID

## 2023-06-07 PROCEDURE — 97110 THERAPEUTIC EXERCISES: CPT

## 2023-06-07 PROCEDURE — 97140 MANUAL THERAPY 1/> REGIONS: CPT

## 2023-06-07 NOTE — PROGRESS NOTES
minutes without deviation-progressing     Long Term Goals:  To be accomplished in 20-24 treatments:              Pt will complain of pain 0-1/10 with all activity-progressing  Pt will increase strength to stabilize the shoulder and maintain proper posture with all activity-progressing   Pt will increase FOTO score by 4 points to meet [address] [facility] [address] and demonstrate improved function with all activity-progressing  Pt will return to all gym and yoga activity-progressing   Pt will be able to lift 10 lbs with proper form and no complaints of pain-progressing     PLAN  Yes  Continue plan of care  Re-Cert Due: 9/21/17  [x]  Upgrade activities as tolerated  []  Discharge due to :  []  Other:      Maia Sampson, LOS       6/7/2023       12:35 PM

## 2023-06-19 ENCOUNTER — HOSPITAL ENCOUNTER (OUTPATIENT)
Facility: HOSPITAL | Age: 68
Setting detail: RECURRING SERIES
Discharge: HOME | End: 2023-06-22
Payer: MEDICAID

## 2023-06-19 PROCEDURE — 97110 THERAPEUTIC EXERCISES: CPT

## 2023-06-19 NOTE — PROGRESS NOTES
PHYSICAL THERAPY - MEDICARE DAILY TREATMENT NOTE (updated 3/23)      Date: 2023          Patient Name:  Lawrence Sandoval :  1955   Medical   Diagnosis:  Left shoulder pain Treatment Diagnosis:  M25.512  LEFT SHOULDER PAIN    Referral Source:  Dr. Debby Garcia:   Payor: Brooklyn Oseguera / Plan: Contreras Hou / Product Type: *No Product type* /                     Patient  verified yes     Visit #   Current  / Total 11 24   Time   In / Out 100 141   Total Treatment Time 41   Total Timed Codes 41   1:1 Treatment Time 41      [facility] Totals Reminder:  bill using total billable   min of TIMED therapeutic procedures and modalities. 8-22 min = 1 unit; 23-37 min = 2 units; 38-52 min = 3 units; 53-67 min = 4 units; 68-82 min = 5 units            SUBJECTIVE    Pain Level (0-10 scale): 3/10    Any medication changes, allergies to medications, adverse drug reactions, diagnosis change, or new procedure performed?: [x] No    [] Yes (see summary sheet for update)  Medications: Verified on Patient Summary List    Subjective functional status/changes:     Pt is without new reports      OBJECTIVE      Therapeutic Procedures: Tx Min Billable or 1:1 Min (if diff from Tx Min) Procedure, Rationale, Specifics        41 41 12299 Therapeutic Exercise (timed):  increase ROM, strength, coordination, balance, and proprioception to improve patient's ability to progress to PLOF and address remaining functional goals. (see flow sheet as applicable)     Details if applicable:     [place] [address] Manual Therapy (timed):  decrease pain, increase ROM, increase tissue extensibility, decrease trigger points, and increase postural awareness to improve patient's ability to progress to PLOF and address remaining functional goals. The manual therapy interventions were performed at a separate and distinct time from the therapeutic activities interventions .  (see flow sheet as applicable)    Details if

## 2023-06-21 ENCOUNTER — APPOINTMENT (OUTPATIENT)
Facility: HOSPITAL | Age: 68
End: 2023-06-21
Payer: MEDICARE

## 2023-06-26 ENCOUNTER — HOSPITAL ENCOUNTER (OUTPATIENT)
Facility: HOSPITAL | Age: 68
Setting detail: RECURRING SERIES
Discharge: HOME | End: 2023-06-29
Payer: MEDICARE

## 2023-06-26 PROCEDURE — 97110 THERAPEUTIC EXERCISES: CPT

## 2023-06-28 ENCOUNTER — HOSPITAL ENCOUNTER (OUTPATIENT)
Facility: HOSPITAL | Age: 68
Setting detail: RECURRING SERIES
Discharge: HOME | End: 2023-07-01
Payer: MEDICARE

## 2023-06-28 PROCEDURE — 97110 THERAPEUTIC EXERCISES: CPT

## 2023-07-03 ENCOUNTER — HOSPITAL ENCOUNTER (OUTPATIENT)
Facility: HOSPITAL | Age: 68
Setting detail: RECURRING SERIES
Discharge: HOME | End: 2023-07-06
Payer: MEDICAID

## 2023-07-03 PROCEDURE — 97110 THERAPEUTIC EXERCISES: CPT

## 2023-07-03 NOTE — PROGRESS NOTES
[address] [facility] Physical Therapy  [facility] [address] ([place]), [address], [address]  Phone: [phone]   Fax: [phone]     PHYSICAL THERAPY PROGRESS NOTE  Patient Name:  Alex Lanza :  1955   Treatment/Medical Diagnosis:   M25.512  LEFT SHOULDER PAIN       Referral Source: Dr. Hector Gomez     Date of Initial Visit:  2023 Attended Visits:  14 Missed Visits:  1     SUMMARY OF TREATMENT/ASSESSMENT:  Patient is a 74year old being seen for L shoulder pain. Patient has been seen for 14 visits and has been treated using manual therapy and therapeutic exercises to make improvements with shoulder AROM, upper quarter muscular strength, and functional outcome measures. CURRENT STATUS  Patient is a 74year old being seen for L shoulder pain. Patient has been seen for 14 visits and has made improvements with shoulder AROM, upper quarter muscular strength, and functional outcome measures. Patient demonstrated improved shoulder AROM during today's reassessment with flexion (178 degrees v 150 degrees previously), and with IR/ER compared to previous reassessment. Patient also noted improved upper quarter muscular strength with less reports of pain today as well. Patient also made improvements with their FOTO today (78/100 v 53/100 previously) noting subjective functional outcomes. Patient continues to go to their gym for classes and to work out but since previous treatment session they have reduced frequency/intensity of gym classes and that they have noted improvements in pain following. Patient has achieved 5/9 goals set for treatment with progress being made towards final goals for rehab. Patient will reduce to 1x/week visits for the next month to transition towards management of L shoulder symptoms. Short Term Goals:  To be accomplished in 8-10 treatments:              Pt will be consistent and demonstrate I with HEP-MET  Pt will complain of

## 2023-07-03 NOTE — PROGRESS NOTES
PHYSICAL THERAPY - MEDICARE DAILY TREATMENT NOTE (updated 3/23)      Date: 7/3/2023          Patient Name:  Renita Palm :  1955   Medical   Diagnosis:  Left shoulder pain Treatment Diagnosis:  M25.512  LEFT SHOULDER PAIN    Referral Source:  Dr. Laraine Fabry:   Payor: Dalia Left / Plan: Leroy Cecil / Product Type: *No Product type* /                     Patient  verified yes     Visit #   Current  / Total 14 24   Time   In / Out 1203 1258   Total Treatment Time 55   Total Timed Codes 55   1:1 Treatment Time 54      [facility] Totals Reminder:  bill using total billable   min of TIMED therapeutic procedures and modalities. 8-22 min = 1 unit; 23-37 min = 2 units; 38-52 min = 3 units; 53-67 min = 4 units; 68-82 min = 5 units            SUBJECTIVE    Pain Level (0-10 scale): 3/10    Any medication changes, allergies to medications, adverse drug reactions, diagnosis change, or new procedure performed?: [x] No    [] Yes (see summary sheet for update)  Medications: Verified on Patient Summary List    Subjective functional status/changes:     Patient noted they felt a lot better following taking a break from the gym over the weekend. Patient also noted they believe they were irritating their shoulder by over working and trying to lift too much weight at the gym and noted reducing load has seemed to help. Patient did note they have been able to do basically everything at the gym without increasing shoulder pain, just continues to note rapid fatigue and not being able to lift as much weight as they used to. Patient did note they have an appointment with their doctor on Wednesday to follow up regarding progress of the shoulder/symptoms. OBJECTIVE      Therapeutic Procedures:   Tx Min Billable or 1:1 Min (if diff from Tx Min) Procedure, Rationale, Specifics        55 55 [id number] Therapeutic Exercise (timed):  increase ROM, strength, coordination, balance, and

## 2023-07-05 ENCOUNTER — APPOINTMENT (OUTPATIENT)
Facility: HOSPITAL | Age: 68
End: 2023-07-05
Payer: MEDICAID

## 2023-07-10 ENCOUNTER — HOSPITAL ENCOUNTER (OUTPATIENT)
Facility: HOSPITAL | Age: 68
Setting detail: RECURRING SERIES
Discharge: HOME | End: 2023-07-13
Payer: MEDICAID

## 2023-07-10 PROCEDURE — 97110 THERAPEUTIC EXERCISES: CPT

## 2023-07-10 NOTE — PROGRESS NOTES
PHYSICAL THERAPY - MEDICARE DAILY TREATMENT NOTE (updated 3/23)      Date: 7/10/2023          Patient Name:  Antoinette Griffin :  1955   Medical   Diagnosis:  Left shoulder pain Treatment Diagnosis:  M25.512  LEFT SHOULDER PAIN    Referral Source:  Dr. Pepper Bass:   Payor: Margo Ashraf / Plan: Anders Arleth / Product Type: *No Product type* /                     Patient  verified yes     Visit #   Current  / Total 15 24   Time   In / Out 1206 1256   Total Treatment Time 50   Total Timed Codes 50   1:1 Treatment Time   44      [facility] Totals Reminder:  bill using total billable   min of TIMED therapeutic procedures and modalities. 8-22 min = 1 unit; 23-37 min = 2 units; 38-52 min = 3 units; 53-67 min = 4 units; 68-82 min = 5 units            SUBJECTIVE    Pain Level (0-10 scale): 3/10    Any medication changes, allergies to medications, adverse drug reactions, diagnosis change, or new procedure performed?: [x] No    [] Yes (see summary sheet for update)  Medications: Verified on Patient Summary List    Subjective functional status/changes:     Patient did note they went back to their doctor, who has told them they believe there is a tear in their shoulder but doesn't want to give them an MRI due to their smoking. Patient noted they have been having a lot of trouble sleeping, noted \"my mind just keeps racing,\" when they try to go to bed. Also noted they have been going to grief counseling but noted there hasn't been too much communication there they have been mostly watching different films there. OBJECTIVE      Therapeutic Procedures: Tx Min Billable or 1:1 Min (if diff from Tx Min) Procedure, Rationale, Specifics        50 39 [id number] Therapeutic Exercise (timed):  increase ROM, strength, coordination, balance, and proprioception to improve patient's ability to progress to PLOF and address remaining functional goals.  (see flow sheet as applicable)     Details if

## 2023-07-12 ENCOUNTER — APPOINTMENT (OUTPATIENT)
Facility: HOSPITAL | Age: 68
End: 2023-07-12
Payer: MEDICAID

## 2023-07-17 ENCOUNTER — APPOINTMENT (OUTPATIENT)
Facility: HOSPITAL | Age: 68
End: 2023-07-17
Payer: MEDICAID

## 2023-07-19 ENCOUNTER — APPOINTMENT (OUTPATIENT)
Facility: HOSPITAL | Age: 68
End: 2023-07-19
Payer: MEDICAID

## 2023-07-24 ENCOUNTER — HOSPITAL ENCOUNTER (OUTPATIENT)
Facility: HOSPITAL | Age: 68
Setting detail: RECURRING SERIES
Discharge: HOME | End: 2023-07-27
Payer: MEDICAID

## 2023-07-24 PROCEDURE — 97110 THERAPEUTIC EXERCISES: CPT

## 2023-07-24 NOTE — PROGRESS NOTES
[facility]  [address] ([place]), [address], [address]  Phone: [phone]   Fax: [phone]     DISCHARGE SUMMARY  Patient Name: Balwinder Mckenzie : 1955   Treatment/Medical Diagnosis: No admission diagnoses are documented for this encounter. Referral Source: No ref. provider found     Date of Initial Visit: 23 Attended Visits: 16 Missed Visits: 1     SUMMARY OF TREATMENT  Pt has completed 16 sessions of PT. She has progressed well and has continued to go to the gym and take part in classes 3 times a week. She is completing all ADL's, household chores, and is doing yard work. She continues to have pain 2-3/10 but most of this is secondary to over working the shoulder. Have educated her on modifying activity and planning to complete tasks to ensure she doesn't do too much. She is to be Dc'd today and encouraged to continue her current activity. AROM is WFL. Strength is ~4/5. CURRENT STATUS  Short Term Goals: To be accomplished in 8-10 treatments:              Pt will be consistent and demonstrate I with HEP-MET  Pt will complain of pain 3-4/10 with all activity- 4/10 - MET   Pt will demonstrate improved ROM to complete all ADL's more efficiently-MET  Pt will be able to self correct and maintain proper posture for 15-20 minutes without deviation-MET     Long Term Goals: To be accomplished in 20-24 treatments:              Pt will complain of pain 0-1/10 with all activity-progressing toward  Pt will increase strength to stabilize the shoulder and maintain proper posture with all activity-progressing toward  Pt will increase FOTO score by 4 points to meet MDC and demonstrate improved function with all activity-MET  Pt will return to all gym and yoga activity-MET  Pt will be able to lift 10 lbs with proper form and no complaints of pain. MET           RECOMMENDATIONS  Discontinue therapy.  Progressing towards or

## 2023-07-24 NOTE — PROGRESS NOTES
PHYSICAL THERAPY - MEDICARE DAILY TREATMENT NOTE (updated 3/23)      Date: 2023          Patient Name:  Teo Sales :  1955   Medical   Diagnosis:  Left shoulder pain Treatment Diagnosis:  M25.512  LEFT SHOULDER PAIN    Referral Source:  Dr. Cruz Novel:   Payor: Constantino Flood / Plan: Dilma Samreen / Product Type: *No Product type* /                     Patient  verified yes     Visit #   Current  / Total 16 24   Time   In / Out 101 151   Total Treatment Time 50   Total Timed Codes 40   1:1 Treatment Time   40      [facility] Totals Reminder:  bill using total billable   min of TIMED therapeutic procedures and modalities. 8-22 min = 1 unit; 23-37 min = 2 units; 38-52 min = 3 units; 53-67 min = 4 units; 68-82 min = 5 units            SUBJECTIVE    Pain Level (0-10 scale): 4/10    Any medication changes, allergies to medications, adverse drug reactions, diagnosis change, or new procedure performed?: [x] No    [] Yes (see summary sheet for update)  Medications: Verified on Patient Summary List    Subjective functional status/changes:   Pt continues to complain of the same pain, but she also reports that she did a lot of hedge trimming (1-2 hours) with the clippers yesterday and has continued in her gym classes and workouts 3x/wk. She is doing a chair yoga class, a boxing class, and regular group fitness class     OBJECTIVE      Therapeutic Procedures: Tx Min Billable or 1:1 Min (if diff from Tx Min) Procedure, Rationale, Specifics        50 40 43402 Therapeutic Exercise (timed):  increase ROM, strength, coordination, balance, and proprioception to improve patient's ability to progress to PLOF and address remaining functional goals.  (see flow sheet as applicable)     Details if applicable:     NT NT [address] Manual Therapy (timed):  decrease pain, increase ROM, increase tissue extensibility, decrease trigger points, and increase postural awareness to improve

## 2023-07-26 ENCOUNTER — APPOINTMENT (OUTPATIENT)
Facility: HOSPITAL | Age: 68
End: 2023-07-26
Payer: MEDICAID

## 2023-09-14 ENCOUNTER — OFFICE VISIT (OUTPATIENT)
Age: 68
End: 2023-09-14

## 2023-09-14 ENCOUNTER — TELEPHONE (OUTPATIENT)
Age: 68
End: 2023-09-14

## 2023-09-14 VITALS
HEART RATE: 72 BPM | TEMPERATURE: 97 F | WEIGHT: 109 LBS | OXYGEN SATURATION: 98 % | RESPIRATION RATE: 15 BRPM | DIASTOLIC BLOOD PRESSURE: 72 MMHG | HEIGHT: 62 IN | BODY MASS INDEX: 20.06 KG/M2 | SYSTOLIC BLOOD PRESSURE: 115 MMHG

## 2023-09-14 DIAGNOSIS — M25.561 ACUTE PAIN OF RIGHT KNEE: Primary | ICD-10-CM

## 2023-09-14 RX ORDER — DICLOFENAC SODIUM 30 MG/G
1 GEL TOPICAL 4 TIMES DAILY
Qty: 4 G | Refills: 0 | Status: SHIPPED | OUTPATIENT
Start: 2023-09-14 | End: 2023-09-14

## 2023-09-14 RX ORDER — DICLOFENAC SODIUM 30 MG/G
1 GEL TOPICAL 4 TIMES DAILY
Qty: 4 G | Refills: 0 | Status: SHIPPED | OUTPATIENT
Start: 2023-09-14 | End: 2023-10-04

## 2023-09-14 ASSESSMENT — PATIENT HEALTH QUESTIONNAIRE - PHQ9
SUM OF ALL RESPONSES TO PHQ QUESTIONS 1-9: 0
SUM OF ALL RESPONSES TO PHQ9 QUESTIONS 1 & 2: 0
2. FEELING DOWN, DEPRESSED OR HOPELESS: 0
1. LITTLE INTEREST OR PLEASURE IN DOING THINGS: 0
SUM OF ALL RESPONSES TO PHQ QUESTIONS 1-9: 0

## 2023-09-14 NOTE — PROGRESS NOTES
has a Wells score of 0. She thus can be cleared for further work-up of thromboembolic event. Patient's symptoms consistent with likely soft tissue strain, primarily gastroc strain. We did discuss the likelihood that this will resolve with symptomatic management, to include medications, activity modification, physical therapy or TENS unit. Patient will proceed with activity modification, medications and TENS unit. She will follow-up in 4 weeks. No x-rays needed at that time. Ms. Ameena Larose has a reminder for a \"due or due soon\" health maintenance. I have asked that she contact her primary care provider for follow-up on this health maintenance.

## 2023-09-27 ENCOUNTER — TELEPHONE (OUTPATIENT)
Age: 68
End: 2023-09-27

## 2023-09-27 NOTE — TELEPHONE ENCOUNTER
Patient is requesting an alternative to the prescribed Diclofenac Sodium 3% GEL as her pharmacy has not received authorization from the insurance company thus far.  She would like the new medication to be sent to her preferred pharmacy of [facility], [address] [phone]

## 2023-10-12 ENCOUNTER — OFFICE VISIT (OUTPATIENT)
Age: 68
End: 2023-10-12
Payer: MEDICAID

## 2023-10-12 VITALS
HEART RATE: 89 BPM | RESPIRATION RATE: 16 BRPM | WEIGHT: 108 LBS | BODY MASS INDEX: 19.88 KG/M2 | TEMPERATURE: 96.3 F | SYSTOLIC BLOOD PRESSURE: 108 MMHG | DIASTOLIC BLOOD PRESSURE: 64 MMHG | HEIGHT: 62 IN | OXYGEN SATURATION: 98 %

## 2023-10-12 DIAGNOSIS — M25.561 ACUTE PAIN OF RIGHT KNEE: Primary | ICD-10-CM

## 2023-10-12 PROCEDURE — 99212 OFFICE O/P EST SF 10 MIN: CPT | Performed by: PHYSICIAN ASSISTANT

## 2023-10-12 PROCEDURE — 1123F ACP DISCUSS/DSCN MKR DOCD: CPT | Performed by: PHYSICIAN ASSISTANT

## 2023-10-12 ASSESSMENT — PATIENT HEALTH QUESTIONNAIRE - PHQ9
SUM OF ALL RESPONSES TO PHQ QUESTIONS 1-9: 0
2. FEELING DOWN, DEPRESSED OR HOPELESS: 0
1. LITTLE INTEREST OR PLEASURE IN DOING THINGS: 0
SUM OF ALL RESPONSES TO PHQ9 QUESTIONS 1 & 2: 0
SUM OF ALL RESPONSES TO PHQ QUESTIONS 1-9: 0

## 2023-10-12 NOTE — PROGRESS NOTES
10/12/2023      CC: right knee pain    HPI:      This is a 79y.o. year old female who presents for a follow up visit. The patient was last seen and diagnosed with right knee osteoarthritis. The patient's treatments since the most recent visit have comprised of topical and oral voltaren. She wasn't able to receive a TENS unit due to her insurance. She states she continues to work out regularly and believes this may be provoking her pain. The patient has had mild to moderate relief of the chief complaint.         PMH:  Past Medical History:   Diagnosis Date    DDD (degenerative disc disease), cervical     Glaucoma, narrow-angle     Heart murmur     Lumbar spondylolysis     Menopause     OCD (obsessive compulsive disorder)     Sun-damaged skin     Tanning bed exposure        PSxHx:  Past Surgical History:   Procedure Laterality Date    CERVICAL FUSION  2004    HYSTERECTOMY (CERVIX STATUS UNKNOWN)         Meds:    Current Outpatient Medications:     diclofenac sodium (VOLTAREN) 1 % GEL, Apply 4 g topically 4 times daily, Disp: 480 g, Rfl: 0    diclofenac (VOLTAREN) 50 MG EC tablet, Take 1 tablet by mouth 2 times daily, Disp: 60 tablet, Rfl: 0    diclofenac sodium (VOLTAREN) 1 % GEL, Apply 2 g topically 4 times daily (Patient not taking: Reported on 10/12/2023), Disp: , Rfl:     All:  Allergies   Allergen Reactions    Penicillins Hives    Codeine Nausea And Vomiting       Social Hx:  Social History     Socioeconomic History    Marital status:      Spouse name: None    Number of children: None    Years of education: None    Highest education level: None   Tobacco Use    Smoking status: Every Day     Packs/day: 1     Types: Cigarettes    Smokeless tobacco: Never   Substance and Sexual Activity    Alcohol use: No     Alcohol/week: 0.0 standard drinks of alcohol    Drug use: No   Social History Narrative    Mom has dementia     Social Determinants of Health     Financial Resource Strain: Low Risk  (4/20/2023)

## 2023-10-19 ENCOUNTER — TELEPHONE (OUTPATIENT)
Age: 68
End: 2023-10-19

## 2023-10-19 NOTE — TELEPHONE ENCOUNTER
Patient is requesting a refill on her prescribed diclofenac sodium 50 mg and her preferred pharmacy is [facility] - [facility], [address], [address] [place] [address] Drive [phone]

## 2023-11-17 ENCOUNTER — OFFICE VISIT (OUTPATIENT)
Age: 68
End: 2023-11-17
Payer: MEDICAID

## 2023-11-17 VITALS
BODY MASS INDEX: 19.82 KG/M2 | TEMPERATURE: 97.8 F | WEIGHT: 107.7 LBS | DIASTOLIC BLOOD PRESSURE: 79 MMHG | OXYGEN SATURATION: 94 % | HEIGHT: 62 IN | SYSTOLIC BLOOD PRESSURE: 131 MMHG

## 2023-11-17 DIAGNOSIS — G89.29 CHRONIC PAIN OF RIGHT KNEE: ICD-10-CM

## 2023-11-17 DIAGNOSIS — M79.661 RIGHT CALF PAIN: Primary | ICD-10-CM

## 2023-11-17 DIAGNOSIS — M25.561 CHRONIC PAIN OF RIGHT KNEE: ICD-10-CM

## 2023-11-17 PROCEDURE — 1123F ACP DISCUSS/DSCN MKR DOCD: CPT | Performed by: PHYSICIAN ASSISTANT

## 2023-11-17 PROCEDURE — 99212 OFFICE O/P EST SF 10 MIN: CPT | Performed by: PHYSICIAN ASSISTANT

## 2023-11-17 NOTE — PROGRESS NOTES
11/17/2023      CC: right knee pain    HPI:      This is a 79y.o. year old female who complains of right knee pain. This is a follow up visit. She has tried topical and oral diclofenac, which she states does help with her pain. However, she continues to have some mild pain behind her right knee. She states she has been \"overdoing it in the gym\". She denies any other acute symptoms.         PMH:  Past Medical History:   Diagnosis Date    DDD (degenerative disc disease), cervical     Glaucoma, narrow-angle     Heart murmur     Lumbar spondylolysis     Menopause     OCD (obsessive compulsive disorder)     Sun-damaged skin     Tanning bed exposure        PSxHx:  Past Surgical History:   Procedure Laterality Date    CERVICAL FUSION  2004    HYSTERECTOMY (CERVIX STATUS UNKNOWN)         Meds:    Current Outpatient Medications:     diclofenac (VOLTAREN) 50 MG EC tablet, Take 1 tablet by mouth 2 times daily, Disp: 60 tablet, Rfl: 0    diclofenac (VOLTAREN) 50 MG EC tablet, Take 1 tablet by mouth 2 times daily, Disp: 60 tablet, Rfl: 0    diclofenac sodium (VOLTAREN) 1 % GEL, Apply 4 g topically 4 times daily, Disp: 480 g, Rfl: 0    diclofenac sodium (VOLTAREN) 1 % GEL, Apply 2 g topically 4 times daily, Disp: , Rfl:     All:  Allergies   Allergen Reactions    Penicillins Hives    Codeine Nausea And Vomiting       Social Hx:  Social History     Socioeconomic History    Marital status:    Tobacco Use    Smoking status: Every Day     Packs/day: 1     Types: Cigarettes    Smokeless tobacco: Never   Substance and Sexual Activity    Alcohol use: No     Alcohol/week: 0.0 standard drinks of alcohol    Drug use: No   Social History Narrative    Mom has dementia     Social Determinants of Health     Financial Resource Strain: Low Risk  (4/20/2023)    Overall Financial Resource Strain (CARDIA)     Difficulty of Paying Living Expenses: Not hard at all   Food Insecurity: No Food Insecurity (4/20/2023)    Hunger Vital Sign

## 2023-11-20 NOTE — TELEPHONE ENCOUNTER
Called and left message she needs to go to  for an Xray.   She should also call back to schedule an annual since she hasn't been here since April 2021 Patient call:      Appointment type: New Endocrine   Provider: Any MD  Return date: see below   Speciality phone number: [phone]  Additional appointment(s) needed: N/A   Additional notes: LVM, MyC x2  schedule within 1-2 weeks per protocol. LUIS FERNANDO on call & LUIS FERNANDO okay per protocol.     Examples:  11/27 [facility] virtual [facility]  12/5 [facility] virtual [facility]  12/6 [facility] in person [facility]  12/8 [facility]  12/27 [facility] virtual [facility]  12/29 [facility] virtual [facility]     Please note that the above appointment(s) will require manual scheduling as they are marked as LUIS FERNANDO and will not appear using auto search. Do not schedule the patient if another patient has already been scheduled in the requested appointment slot.    Lena Patel on 11/20/2023 at 11:12 AM

## 2024-03-14 ENCOUNTER — TELEPHONE (OUTPATIENT)
Age: 69
End: 2024-03-14

## 2024-03-14 NOTE — TELEPHONE ENCOUNTER
Patient called in requesting her provider Em WORLEY to call in two refills for the following medications: Diclofenac Sodium 1% gel and Diclofenac Sodium DR 50mg tablet. She requested it be sent to her preferred pharmacy: [facility] - [address], [address] - P [phone] - F [phone] so it could be delivered to her. She requested a call back at [phone].

## 2024-03-19 ENCOUNTER — TELEPHONE (OUTPATIENT)
Age: 69
End: 2024-03-19

## 2024-03-19 RX ORDER — LIDOCAINE 50 MG/G
1 PATCH TOPICAL DAILY
Qty: 30 PATCH | Refills: 0 | Status: SHIPPED | OUTPATIENT
Start: 2024-03-19 | End: 2024-04-18

## 2024-03-19 NOTE — TELEPHONE ENCOUNTER
Called patient. No answer at this time . LVM to call office back regarding the alternative medication request.

## 2024-03-19 NOTE — TELEPHONE ENCOUNTER
LVM for patient regarding her alternative medication request and provided our call back number in case she had any questions.

## 2024-03-19 NOTE — TELEPHONE ENCOUNTER
Patient called in stating the Diclofenac Sodium 1% gel is considered OTC and her insurance will not cover OTC items. Patient is requesting a possible alternative to be prescribed and sent to her preferred pharmacy [facility]- [place], [place]-  [facility] [address], [address]- P [phone] F [phone]. Patient is requesting a call back with an update at [phone].

## 2024-03-20 ENCOUNTER — TELEPHONE (OUTPATIENT)
Age: 69
End: 2024-03-20

## 2024-03-20 RX ORDER — ASPIRIN 81 MG
1 TABLET,CHEWABLE ORAL 2 TIMES DAILY
Qty: 300 G | Refills: 0 | Status: SHIPPED | OUTPATIENT
Start: 2024-03-20 | End: 2024-04-19

## 2024-03-20 NOTE — TELEPHONE ENCOUNTER
Called patient. 2 patient identifiers were used to identify patient.  Advised patient that the doctor had sent in a new prescription to her pharmacy. Patient stated she would check with pharmacy to see if mediation was covered by insurance and give office a call back.

## 2024-03-20 NOTE — TELEPHONE ENCOUNTER
Received call from patient. Patients states New prescription for lidocaine is not covered by her insurance and she would like to know what else could be prescribed in place of the Lidocaine patches.

## 2024-03-20 NOTE — TELEPHONE ENCOUNTER
Called patient. Identification verified with 2 patient identifiers (name and ). Informed patient that the doctor had sent in a new prescription for Capsaicin cream and also advised that it was not many alternatives to choose from, per . Patient voiced understanding and stated she would call her pharmacy.     1:49pm: Patient called back stating that her insurance would not cover the new prescription(Capsaicin). Patient stated she would find out how the cream would cost her out of pocket.

## 2024-05-22 ENCOUNTER — TELEPHONE (OUTPATIENT)
Age: 69
End: 2024-05-22

## 2024-05-22 NOTE — TELEPHONE ENCOUNTER
----- Message from Constanza Knapp sent at 5/21/2024  4:43 PM EDT -----  Subject: Message to Provider    QUESTIONS  Information for Provider? Patient would like to know if someone in your   office remove cyst, or if Dr. Miller could refer her to a dermatologist to   have it removed from above her nose that accepts her insurance. Please   call and let her know.  ---------------------------------------------------------------------------  --------------  CALL BACK INFO  [phone]; OK to leave message on voicemail  ---------------------------------------------------------------------------  --------------  SCRIPT ANSWERS  Relationship to Patient? Self

## 2024-05-22 NOTE — TELEPHONE ENCOUNTER
Called, LM for pt to call back and schedule an OV to discuss cyst on nose w/ PCP. Pt will need to call back and schedule appt.

## 2024-05-30 ENCOUNTER — OFFICE VISIT (OUTPATIENT)
Age: 69
End: 2024-05-30
Payer: MEDICARE

## 2024-05-30 VITALS
HEIGHT: 62 IN | TEMPERATURE: 98.1 F | BODY MASS INDEX: 20.17 KG/M2 | WEIGHT: 109.6 LBS | RESPIRATION RATE: 18 BRPM | SYSTOLIC BLOOD PRESSURE: 118 MMHG | OXYGEN SATURATION: 98 % | DIASTOLIC BLOOD PRESSURE: 68 MMHG | HEART RATE: 85 BPM

## 2024-05-30 DIAGNOSIS — Z87.891 PERSONAL HISTORY OF TOBACCO USE: Primary | ICD-10-CM

## 2024-05-30 DIAGNOSIS — F33.1 MAJOR DEPRESSIVE DISORDER, RECURRENT, MODERATE (HCC): ICD-10-CM

## 2024-05-30 DIAGNOSIS — E78.00 HIGH CHOLESTEROL: ICD-10-CM

## 2024-05-30 DIAGNOSIS — Z12.11 SCREEN FOR COLON CANCER: ICD-10-CM

## 2024-05-30 DIAGNOSIS — Z12.31 ENCOUNTER FOR SCREENING MAMMOGRAM FOR MALIGNANT NEOPLASM OF BREAST: ICD-10-CM

## 2024-05-30 DIAGNOSIS — Z00.00 MEDICARE ANNUAL WELLNESS VISIT, SUBSEQUENT: ICD-10-CM

## 2024-05-30 LAB
ANION GAP SERPL CALC-SCNC: 3 MMOL/L (ref 5–15)
BUN SERPL-MCNC: 16 MG/DL (ref 6–20)
BUN/CREAT SERPL: 20 (ref 12–20)
CALCIUM SERPL-MCNC: 9.4 MG/DL (ref 8.5–10.1)
CHLORIDE SERPL-SCNC: 108 MMOL/L (ref 97–108)
CHOLEST SERPL-MCNC: 232 MG/DL
CO2 SERPL-SCNC: 27 MMOL/L (ref 21–32)
CREAT SERPL-MCNC: 0.79 MG/DL (ref 0.55–1.02)
ERYTHROCYTE [DISTWIDTH] IN BLOOD BY AUTOMATED COUNT: 14.6 % (ref 11.5–14.5)
GLUCOSE SERPL-MCNC: 96 MG/DL (ref 65–100)
HCT VFR BLD AUTO: 44.9 % (ref 35–47)
HDLC SERPL-MCNC: 79 MG/DL
HDLC SERPL: 2.9 (ref 0–5)
HGB BLD-MCNC: 14.5 G/DL (ref 11.5–16)
LDLC SERPL CALC-MCNC: 142.6 MG/DL (ref 0–100)
MCH RBC QN AUTO: 29.4 PG (ref 26–34)
MCHC RBC AUTO-ENTMCNC: 32.3 G/DL (ref 30–36.5)
MCV RBC AUTO: 91.1 FL (ref 80–99)
NRBC # BLD: 0 K/UL (ref 0–0.01)
NRBC BLD-RTO: 0 PER 100 WBC
PLATELET # BLD AUTO: 154 K/UL (ref 150–400)
PMV BLD AUTO: 11.5 FL (ref 8.9–12.9)
POTASSIUM SERPL-SCNC: 4.1 MMOL/L (ref 3.5–5.1)
RBC # BLD AUTO: 4.93 M/UL (ref 3.8–5.2)
SODIUM SERPL-SCNC: 138 MMOL/L (ref 136–145)
TRIGL SERPL-MCNC: 52 MG/DL
VLDLC SERPL CALC-MCNC: 10.4 MG/DL
WBC # BLD AUTO: 5.3 K/UL (ref 3.6–11)

## 2024-05-30 PROCEDURE — 1123F ACP DISCUSS/DSCN MKR DOCD: CPT | Performed by: INTERNAL MEDICINE

## 2024-05-30 PROCEDURE — G0296 VISIT TO DETERM LDCT ELIG: HCPCS | Performed by: INTERNAL MEDICINE

## 2024-05-30 PROCEDURE — 3017F COLORECTAL CA SCREEN DOC REV: CPT | Performed by: INTERNAL MEDICINE

## 2024-05-30 PROCEDURE — G0439 PPPS, SUBSEQ VISIT: HCPCS | Performed by: INTERNAL MEDICINE

## 2024-05-30 SDOH — ECONOMIC STABILITY: FOOD INSECURITY: WITHIN THE PAST 12 MONTHS, YOU WORRIED THAT YOUR FOOD WOULD RUN OUT BEFORE YOU GOT MONEY TO BUY MORE.: NEVER TRUE

## 2024-05-30 SDOH — ECONOMIC STABILITY: HOUSING INSECURITY
IN THE LAST 12 MONTHS, WAS THERE A TIME WHEN YOU DID NOT HAVE A STEADY PLACE TO SLEEP OR SLEPT IN A SHELTER (INCLUDING NOW)?: NO

## 2024-05-30 SDOH — ECONOMIC STABILITY: FOOD INSECURITY: WITHIN THE PAST 12 MONTHS, THE FOOD YOU BOUGHT JUST DIDN'T LAST AND YOU DIDN'T HAVE MONEY TO GET MORE.: NEVER TRUE

## 2024-05-30 SDOH — ECONOMIC STABILITY: INCOME INSECURITY: HOW HARD IS IT FOR YOU TO PAY FOR THE VERY BASICS LIKE FOOD, HOUSING, MEDICAL CARE, AND HEATING?: NOT HARD AT ALL

## 2024-05-30 ASSESSMENT — PATIENT HEALTH QUESTIONNAIRE - PHQ9
6. FEELING BAD ABOUT YOURSELF - OR THAT YOU ARE A FAILURE OR HAVE LET YOURSELF OR YOUR FAMILY DOWN: NOT AT ALL
4. FEELING TIRED OR HAVING LITTLE ENERGY: NOT AT ALL
7. TROUBLE CONCENTRATING ON THINGS, SUCH AS READING THE NEWSPAPER OR WATCHING TELEVISION: NOT AT ALL
SUM OF ALL RESPONSES TO PHQ QUESTIONS 1-9: 0
SUM OF ALL RESPONSES TO PHQ QUESTIONS 1-9: 0
3. TROUBLE FALLING OR STAYING ASLEEP: NOT AT ALL
SUM OF ALL RESPONSES TO PHQ QUESTIONS 1-9: 0
1. LITTLE INTEREST OR PLEASURE IN DOING THINGS: NOT AT ALL
2. FEELING DOWN, DEPRESSED OR HOPELESS: NOT AT ALL
8. MOVING OR SPEAKING SO SLOWLY THAT OTHER PEOPLE COULD HAVE NOTICED. OR THE OPPOSITE, BEING SO FIGETY OR RESTLESS THAT YOU HAVE BEEN MOVING AROUND A LOT MORE THAN USUAL: NOT AT ALL
10. IF YOU CHECKED OFF ANY PROBLEMS, HOW DIFFICULT HAVE THESE PROBLEMS MADE IT FOR YOU TO DO YOUR WORK, TAKE CARE OF THINGS AT HOME, OR GET ALONG WITH OTHER PEOPLE: NOT DIFFICULT AT ALL
5. POOR APPETITE OR OVEREATING: NOT AT ALL
SUM OF ALL RESPONSES TO PHQ QUESTIONS 1-9: 0
9. THOUGHTS THAT YOU WOULD BE BETTER OFF DEAD, OR OF HURTING YOURSELF: NOT AT ALL
SUM OF ALL RESPONSES TO PHQ9 QUESTIONS 1 & 2: 0

## 2024-05-30 ASSESSMENT — LIFESTYLE VARIABLES
HOW OFTEN DO YOU HAVE A DRINK CONTAINING ALCOHOL: NEVER
HOW MANY STANDARD DRINKS CONTAINING ALCOHOL DO YOU HAVE ON A TYPICAL DAY: PATIENT DOES NOT DRINK

## 2024-05-30 NOTE — PATIENT INSTRUCTIONS
follow-up, he or she will help you understand what to do next.  After a lung cancer screening, you can go back to your usual activities right away.  A lung cancer screening test can't tell if you have lung cancer. If your results are positive, your doctor can't tell whether an abnormal finding is a harmless nodule, cancer, or something else without doing more tests.  What can you do to help prevent lung cancer?  Some lung cancers can't be prevented. But if you smoke, quitting smoking is the best step you can take to prevent lung cancer. If you want to quit, your doctor can recommend medicines or other ways to help.  Follow-up care is a key part of your treatment and safety. Be sure to make and go to all appointments, and call your doctor if you are having problems. It's also a good idea to know your test results and keep a list of the medicines you take.  Where can you learn more?  Go to https://www.[employer].net/patientEd and enter Q940 to learn more about \"Learning About Lung Cancer Screening.\"  Current as of: October 25, 2023               Content Version: 14.0  © 0833-3141 [employer].   Care instructions adapted under license by [employer]. If you have questions about a medical condition or this instruction, always ask your healthcare professional. [employer] disclaims any warranty or liability for your use of this information.           Advance Directives: Care Instructions  Overview  An advance directive is a legal way to state your wishes at the end of your life. It tells your family and your doctor what to do if you can't say what you want.  There are two main types of advance directives. You can change them any time your wishes change.  Living will.  This form tells your family and your doctor your wishes about life support and other treatment. The form is also called a declaration.  Medical power of .  This form lets you name a person to make treatment decisions for

## 2024-05-30 NOTE — PROGRESS NOTES
Discussed with the patient the current USPSTF guidelines released March 9, 2021 for screening for lung cancer.    For adults aged 50 to 80 years who have a 20 pack-year smoking history and currently smoke or have quit within the past 15 years the grade B recommendation is to:  Screen for lung cancer with low-dose computed tomography (LDCT) every year.  Stop screening once a person has not smoked for 15 years or has a health problem that limits life expectancy or the ability to have lung surgery.    The patient  reports that she has been smoking cigarettes. She has never used smokeless tobacco.. Discussed with patient the risks and benefits of screening, including over-diagnosis, false positive rate, and total radiation exposure.  The patient currently exhibits no signs or symptoms suggestive of lung cancer.  Discussed with patient the importance of compliance with yearly annual lung cancer screenings and willingness to undergo diagnosis and treatment if screening scan is positive.  In addition, the patient was counseled regarding the importance of remaining smoke free and/or total smoking cessation.    Also reviewed the following if the patient has Medicare that as of February 10, 2022, Medicare only covers LDCT screening in patients aged 50-77 with at least a 20 pack-year smoking history who currently smoke or have quit in the last 15 years  
\"Have you been to the ER, urgent care clinic since your last visit?  Hospitalized since your last visit?\"    NO    “Have you seen or consulted any other health care providers outside of [facility] since your last visit?”    NO        “Have you had a colorectal cancer screening such as a colonoscopy/FIT/Cologuard?    NO    No colonoscopy on file  No cologuard on file  Date of last FIT: 7/16/2021   No flexible sigmoidoscopy on file         Click Here for Release of Records Request  
RRR, normal S1S2, no murmur.    Gastrointestinal: normal bowel sounds, soft, nontender, without masses.  No hepatosplenomegaly.  Extremities +2 pulses, no edema, normal sensation   Musculoskeletal:  Normal gait. Normal digits and nails.  Normal strength and tone, no atrophy, and no abnormal movement.  Skin:  Flesh-colored, raised cyst noted on the left nasal bridge 2 mm.  Neuro:  A and OX4, fluent speech, cranial nerves normal 2-12.    Psych:  Normal affect     Objective   Blood pressure 118/68, pulse 85, temperature 98.1 °F (36.7 °C), resp. rate 18, height 1.575 m (5' 2\"), weight 49.7 kg (109 lb 9.6 oz), SpO2 98 %.  Physical Exam  Flesh-colored, raised cyst noted on the left nasal bridge 2 mm.           The patient (or guardian, if applicable) and other individuals in attendance with the patient were advised that Artificial Intelligence will be utilized during this visit to record and process the conversation to generate a clinical note. The patient (or guardian, if applicable) and other individuals in attendance at the appointment consented to the use of AI, including the recording.      An electronic signature was used to authenticate this note.    --Keli Miller MD     Medicare Annual Wellness Visit    Chary Watson is here for Cyst (Left side of upper nose near eye) and Medicare AWV (/)    Assessment & Plan   Personal history of tobacco use  -     FL VISIT TO DISCUSS LUNG CA SCREEN W LDCT  -     CT Lung Screen (Initial/Annual/Baseline); Future  Encounter for screening mammogram for malignant neoplasm of breast  -     JOSÉ ANTONIO IGGY DIGITAL SCREEN BILATERAL; Future  Major depressive disorder, recurrent, moderate (HCC)  -     Basic Metabolic Panel; Future  -     CBC; Future  Screen for colon cancer  -     Cologuard (Fecal DNA Colorectal Cancer Screening)  High cholesterol  -     Lipid Panel; Future  Medicare annual wellness visit, subsequent    Recommendations for Preventive Services Due: see orders and patient

## 2024-06-11 ENCOUNTER — TELEPHONE (OUTPATIENT)
Age: 69
End: 2024-06-11

## 2024-06-27 ENCOUNTER — HOSPITAL ENCOUNTER (OUTPATIENT)
Facility: HOSPITAL | Age: 69
Discharge: HOME | End: 2024-06-27
Attending: INTERNAL MEDICINE
Payer: MEDICARE

## 2024-06-27 ENCOUNTER — HOSPITAL ENCOUNTER (OUTPATIENT)
Facility: HOSPITAL | Age: 69
End: 2024-06-27
Attending: INTERNAL MEDICINE
Payer: MEDICARE

## 2024-06-27 ENCOUNTER — TELEPHONE (OUTPATIENT)
Age: 69
End: 2024-06-27

## 2024-06-27 DIAGNOSIS — M54.2 NECK PAIN: Primary | ICD-10-CM

## 2024-06-27 DIAGNOSIS — Z87.891 PERSONAL HISTORY OF TOBACCO USE: ICD-10-CM

## 2024-06-27 DIAGNOSIS — Z12.31 ENCOUNTER FOR SCREENING MAMMOGRAM FOR MALIGNANT NEOPLASM OF BREAST: ICD-10-CM

## 2024-06-27 PROCEDURE — 71271 CT THORAX LUNG CANCER SCR C-: CPT

## 2024-06-27 PROCEDURE — 77063 BREAST TOMOSYNTHESIS BI: CPT

## 2024-06-27 NOTE — TELEPHONE ENCOUNTER
----- Message from Tai Lott sent at 6/27/2024  1:11 PM EDT -----  Regarding: FW: ECC Referral Request    ----- Message -----  From: Veronica Valencia  Sent: 6/27/2024   1:05 PM EDT  To: #  Subject: ECC Referral Request                             ECC Referral Request    Reason for referral request: Specialty Provider    Specialist/Lab/Test patient is requesting (if known): [facility]    Specialist Phone Number: [phone]     Additional Information this is for the referral therapy for the neck   --------------------------------------------------------------------------------------------------------------------------    Relationship to Patient: Self     Call Back Information: OK to leave message on voicemail  Preferred Call Back Number: Phone  [phone]

## 2024-07-30 LAB — NONINV COLON CA DNA+OCC BLD SCRN STL QL: NEGATIVE

## 2024-08-01 ENCOUNTER — TELEPHONE (OUTPATIENT)
Age: 69
End: 2024-08-01

## 2024-08-01 NOTE — TELEPHONE ENCOUNTER
Spoke with pt, verified two pt identifiers.   Informed pt \"Negative mammogram. No mammographic evidence of malignancy. & 1.Lung-RADS 1: Negative/ no nodule or lung cancer.  Continue annual screening with  CT scan of lungs in 12 months.2. No thoracic lymphadenopathy.  3. Mild emphysematous changes.\" per Keli Moreno MD, pt verified understanding, no further questions at this time.

## 2024-08-01 NOTE — TELEPHONE ENCOUNTER
Pt states that she had two different scans/test done and has not gotten the results. She would like a call to go over those.      She can come in and  if needed.  She never got anything in the mail at all pt states.    Pt will be gone this morning to PT.  Please call early afternoon OR leave on vm OR she can come in to pick the results up?      Again, you may leave detailed vm

## 2024-09-05 ENCOUNTER — TELEPHONE (OUTPATIENT)
Age: 69
End: 2024-09-05

## 2024-09-05 NOTE — TELEPHONE ENCOUNTER
----- Message from Clotilde BARRIGA sent at 9/5/2024  1:55 PM EDT -----  Regarding: ECC Referral Request  ECC Referral Request    Reason for referral request: Lab/Test Order    Specialist/Lab/Test patient is requesting (if known): [facility]  Specialist Phone Number (if applicable):    Additional Information: Patient wants to have a ct scan or mri in her neck and wants a referral from her provider Keli Moreno MD  --------------------------------------------------------------------------------------------------------------------------    Relationship to Patient: Self     Call Back Information: OK to leave message on voicemail  Preferred Call Back Number: Phone [phone]

## 2024-09-06 ENCOUNTER — TELEPHONE (OUTPATIENT)
Age: 69
End: 2024-09-06

## 2024-09-06 NOTE — TELEPHONE ENCOUNTER
Left message to schedule appt. Soonest appt I see is 10/1 @ 9:30 am, if she requires a sooner appt she may require a call from clinical team

## 2024-10-01 ENCOUNTER — TELEPHONE (OUTPATIENT)
Age: 69
End: 2024-10-01

## 2024-10-01 ENCOUNTER — OFFICE VISIT (OUTPATIENT)
Age: 69
End: 2024-10-01
Payer: MEDICARE

## 2024-10-01 VITALS
SYSTOLIC BLOOD PRESSURE: 129 MMHG | RESPIRATION RATE: 20 BRPM | BODY MASS INDEX: 19.54 KG/M2 | TEMPERATURE: 98.1 F | HEIGHT: 62 IN | OXYGEN SATURATION: 99 % | WEIGHT: 106.2 LBS | DIASTOLIC BLOOD PRESSURE: 85 MMHG | HEART RATE: 82 BPM

## 2024-10-01 DIAGNOSIS — F17.200 TOBACCO DEPENDENCY: ICD-10-CM

## 2024-10-01 DIAGNOSIS — M54.2 NECK PAIN: Primary | ICD-10-CM

## 2024-10-01 DIAGNOSIS — J43.1 PANLOBULAR EMPHYSEMA (HCC): ICD-10-CM

## 2024-10-01 DIAGNOSIS — E78.00 HIGH CHOLESTEROL: ICD-10-CM

## 2024-10-01 DIAGNOSIS — Z87.891 PERSONAL HISTORY OF TOBACCO USE: ICD-10-CM

## 2024-10-01 DIAGNOSIS — Z72.0 TOBACCO ABUSE: Primary | ICD-10-CM

## 2024-10-01 PROCEDURE — G8400 PT W/DXA NO RESULTS DOC: HCPCS | Performed by: INTERNAL MEDICINE

## 2024-10-01 PROCEDURE — G8420 CALC BMI NORM PARAMETERS: HCPCS | Performed by: INTERNAL MEDICINE

## 2024-10-01 PROCEDURE — 99214 OFFICE O/P EST MOD 30 MIN: CPT | Performed by: INTERNAL MEDICINE

## 2024-10-01 PROCEDURE — 1123F ACP DISCUSS/DSCN MKR DOCD: CPT | Performed by: INTERNAL MEDICINE

## 2024-10-01 PROCEDURE — G8427 DOCREV CUR MEDS BY ELIG CLIN: HCPCS | Performed by: INTERNAL MEDICINE

## 2024-10-01 PROCEDURE — 4004F PT TOBACCO SCREEN RCVD TLK: CPT | Performed by: INTERNAL MEDICINE

## 2024-10-01 PROCEDURE — 3017F COLORECTAL CA SCREEN DOC REV: CPT | Performed by: INTERNAL MEDICINE

## 2024-10-01 PROCEDURE — 3023F SPIROM DOC REV: CPT | Performed by: INTERNAL MEDICINE

## 2024-10-01 PROCEDURE — 1090F PRES/ABSN URINE INCON ASSESS: CPT | Performed by: INTERNAL MEDICINE

## 2024-10-01 PROCEDURE — G8484 FLU IMMUNIZE NO ADMIN: HCPCS | Performed by: INTERNAL MEDICINE

## 2024-10-01 RX ORDER — METHYLPREDNISOLONE 4 MG
TABLET, DOSE PACK ORAL
Qty: 1 KIT | Refills: 0 | Status: SHIPPED | OUTPATIENT
Start: 2024-10-01 | End: 2024-10-07

## 2024-10-01 RX ORDER — VARENICLINE TARTRATE 0.5 MG/1
.5-1 TABLET, FILM COATED ORAL SEE ADMIN INSTRUCTIONS
Qty: 57 TABLET | Refills: 0 | Status: SHIPPED | OUTPATIENT
Start: 2024-10-01

## 2024-10-01 RX ORDER — NICOTINE 21 MG/24HR
1 PATCH, TRANSDERMAL 24 HOURS TRANSDERMAL DAILY
Qty: 42 PATCH | Refills: 0 | Status: SHIPPED | OUTPATIENT
Start: 2024-10-01 | End: 2024-11-12

## 2024-10-01 NOTE — TELEPHONE ENCOUNTER
Pt states that most insurances are paying for [place] to stop smoking.    Please call this into  [facility] #[phone]    Pt is trying to get this right away so they can deliver all meds at the same time today.

## 2024-10-01 NOTE — PROGRESS NOTES
Chary Watson (:  1955) is a 69 y.o. female, Established patient, here for evaluation of the following chief complaint(s):  Neck Pain (Has been to PT for neck over a month with no relief, two rounds of dry needling/ pain getting worse./Requesting CAT scan or MRI)         Assessment & Plan  1. Neck Pain. Hx of neck surgery in the past  The patient reports persistent neck pain despite undergoing physical therapy and dry needling. She has a history of neck surgery 20 years ago with a plate and four screws. She experiences pain radiating from the neck down the side but no numbness or tingling in her hands. A Medrol Dosepak will be administered to alleviate the inflammation. The patient was educated on the use of the Medrol Dosepak, including the dosing schedule and potential side effects.    2 asymptomatic. Emphysema.  The patient has mild emphysema as revealed by a CT lung screening in May 2024. She smokes approximately one pack of cigarettes per day. The importance of smoking cessation was discussed, and the patient was shown images of healthy versus emphysematous lungs to illustrate the damage caused by smoking. A high-dose nicotine patch will be prescribed to assist in smoking cessation. The patient was provided with information on quitting smoking and the effects of emphysema.    3. Depression.  The patient previously discussed depression following the death of her  but declined medication. She reports that her grief has improved, although she is still dealing with issues related to her daughter. No new treatment was initiated for depression at this visit.    Health Maintenance.  The patient had a mammogram on May 30, 2024, which was negative. She declined the pneumonia vaccine but received COVID-19 vaccinations.        Results  Imaging  CT lung screening showed no nodules but mild emphysema.  1. Neck pain  -     methylPREDNISolone (MEDROL DOSEPACK) 4 MG tablet; Take by mouth., Disp-1 kit,

## 2024-10-01 NOTE — PROGRESS NOTES
\"Have you been to the ER, urgent care clinic since your last visit?  Hospitalized since your last visit?\"    NO    “Have you seen or consulted any other health care providers outside our system since your last visit?”    NO

## 2024-10-09 ENCOUNTER — HOSPITAL ENCOUNTER (OUTPATIENT)
Facility: HOSPITAL | Age: 69
Discharge: HOME | End: 2024-10-12
Payer: MEDICARE

## 2024-10-09 ENCOUNTER — TELEPHONE (OUTPATIENT)
Age: 69
End: 2024-10-09

## 2024-10-09 DIAGNOSIS — M54.2 NECK PAIN: Primary | ICD-10-CM

## 2024-10-09 DIAGNOSIS — M54.2 NECK PAIN: ICD-10-CM

## 2024-10-09 PROCEDURE — 72050 X-RAY EXAM NECK SPINE 4/5VWS: CPT

## 2024-10-09 NOTE — TELEPHONE ENCOUNTER
Pt has done PT and steroids for her neck.  She needs the next step.  What is this?  Pt wanted an appt to come in today to discuss.     Please call to see what you can do for pt.      She expresses again the need to come in today to talk with Dr. Negrete.  Does she want to call something else in?    Please call /pt will be available about noon, BUT you may leave a detailed message.  Thanks.

## 2024-10-09 NOTE — TELEPHONE ENCOUNTER
Patient wants to know what the next step is. She did medication and PT. She wants to know what the next step is.

## 2024-10-09 NOTE — TELEPHONE ENCOUNTER
Spoke to pt using two pt identifiers.  Pt was informed of PCP response and confirmed understanding.  Pt was provided with information of where to get xray as well as referral information for Dr. Quinn.    Pt states she would like to have something for pain but the only thing she can take is 800mg tylenol.    Please advise.

## 2024-10-10 ENCOUNTER — TELEPHONE (OUTPATIENT)
Age: 69
End: 2024-10-10

## 2024-10-10 NOTE — TELEPHONE ENCOUNTER
Pt states that she has never been on a medication that started with an L (she didn't know name)   she would like this called in and has been waiting.   This is for her neck.      Pt is asking for this to be called into [facility]   She has been calling the pharmacy and nothing has been called in for her.     Please call pt

## 2024-10-10 NOTE — TELEPHONE ENCOUNTER
Pt returned call regarding 10/9 encounter  (Unable to document on original encounter due to it being signed/closed already)    Advised of clinical team note:    inform pt Msg sent to provider and provider is currently in clinic.      Pt states ok and is very ready for medication to be sent in.

## 2024-10-10 NOTE — TELEPHONE ENCOUNTER
Katie.   Dr. Kearney   States doesn't believe she has taken lyrica    But states that she cannot take anything like codeine or anything that will make her feel \"high\"    States can take ibuprofen or the tylenol.    States will try lyrica if it won't make her feel high.      Please use [facility] pharmacy      Pt phone [phone]

## 2024-10-10 NOTE — TELEPHONE ENCOUNTER
LM to inform pt Msg sent to provider and provider is currently in clinic.    Handled in another encounter

## 2024-10-11 ENCOUNTER — TELEPHONE (OUTPATIENT)
Age: 69
End: 2024-10-11

## 2024-10-11 DIAGNOSIS — G89.29 OTHER CHRONIC PAIN: Primary | ICD-10-CM

## 2024-10-11 RX ORDER — PREGABALIN 50 MG/1
50 CAPSULE ORAL 2 TIMES DAILY
Qty: 60 CAPSULE | Refills: 0 | Status: SHIPPED | OUTPATIENT
Start: 2024-10-11 | End: 2024-11-12

## 2024-10-11 NOTE — TELEPHONE ENCOUNTER
Per previous encounter. Pt states she normally gets 800mg tylenol to assist with pain management. Pt states she can try lyrica as long as it does not make her feel \"high\".

## 2024-10-11 NOTE — TELEPHONE ENCOUNTER
PT is requesting medication for pain for her neck pain. She came in to follow up on a call she placed 10/10/2024.     Patient was advised by MA to wait for Dr. Negrete for prescribed mediation based off medication restrictions.     Please further assist.

## 2024-10-17 ENCOUNTER — TELEPHONE (OUTPATIENT)
Age: 69
End: 2024-10-17

## 2024-10-17 NOTE — TELEPHONE ENCOUNTER
Pt returning your call.    Please leave results on pt's phone.  She has been in and out of appts and can't always have her phone.

## 2024-10-18 NOTE — TELEPHONE ENCOUNTER
Spoke with patient on 10/17/2024. Went over x-ray. [facility] to [facility] up [facility] for patient and was told they would call patient to set up apt.     Patient came to the office today.   Picked up x-ray results.

## 2025-02-06 ENCOUNTER — HOSPITAL ENCOUNTER (OUTPATIENT)
Facility: HOSPITAL | Age: 70
Discharge: HOME | End: 2025-02-09
Attending: PHYSICAL MEDICINE & REHABILITATION
Payer: MEDICARE

## 2025-02-06 DIAGNOSIS — M47.812 CERVICAL SPONDYLOSIS WITHOUT MYELOPATHY: ICD-10-CM

## 2025-02-06 DIAGNOSIS — M50.30 DEGENERATION OF CERVICAL INTERVERTEBRAL DISC: ICD-10-CM

## 2025-02-06 DIAGNOSIS — M43.22 CERVICAL VERTEBRAL FUSION: ICD-10-CM

## 2025-02-06 DIAGNOSIS — M54.12 BRACHIAL NEURITIS: ICD-10-CM

## 2025-02-06 DIAGNOSIS — M54.2 CERVICALGIA: ICD-10-CM

## 2025-02-06 PROCEDURE — 72141 MRI NECK SPINE W/O DYE: CPT
